# Patient Record
Sex: FEMALE | Race: WHITE | Employment: OTHER | URBAN - METROPOLITAN AREA
[De-identification: names, ages, dates, MRNs, and addresses within clinical notes are randomized per-mention and may not be internally consistent; named-entity substitution may affect disease eponyms.]

---

## 2018-09-19 ENCOUNTER — APPOINTMENT (OUTPATIENT)
Dept: GENERAL RADIOLOGY | Facility: CLINIC | Age: 73
End: 2018-09-19
Attending: EMERGENCY MEDICINE
Payer: MEDICARE

## 2018-09-19 ENCOUNTER — HOSPITAL ENCOUNTER (EMERGENCY)
Facility: CLINIC | Age: 73
Discharge: HOME | End: 2018-09-19
Attending: EMERGENCY MEDICINE | Admitting: EMERGENCY MEDICINE
Payer: MEDICARE

## 2018-09-19 VITALS
HEART RATE: 69 BPM | OXYGEN SATURATION: 93 % | RESPIRATION RATE: 18 BRPM | DIASTOLIC BLOOD PRESSURE: 67 MMHG | TEMPERATURE: 97.4 F | SYSTOLIC BLOOD PRESSURE: 157 MMHG

## 2018-09-19 DIAGNOSIS — S73.005A DISLOCATION OF LEFT HIP, INITIAL ENCOUNTER (H): ICD-10-CM

## 2018-09-19 LAB
ANION GAP SERPL CALCULATED.3IONS-SCNC: 9 MMOL/L (ref 3–14)
BASOPHILS # BLD AUTO: 0 10E9/L (ref 0–0.2)
BASOPHILS NFR BLD AUTO: 0.6 %
BUN SERPL-MCNC: 23 MG/DL (ref 7–30)
CALCIUM SERPL-MCNC: 8.6 MG/DL (ref 8.5–10.1)
CHLORIDE SERPL-SCNC: 106 MMOL/L (ref 94–109)
CO2 SERPL-SCNC: 24 MMOL/L (ref 20–32)
CREAT SERPL-MCNC: 0.81 MG/DL (ref 0.52–1.04)
DIFFERENTIAL METHOD BLD: NORMAL
EOSINOPHIL # BLD AUTO: 0.1 10E9/L (ref 0–0.7)
EOSINOPHIL NFR BLD AUTO: 2.1 %
ERYTHROCYTE [DISTWIDTH] IN BLOOD BY AUTOMATED COUNT: 12.8 % (ref 10–15)
GFR SERPL CREATININE-BSD FRML MDRD: 69 ML/MIN/1.7M2
GLUCOSE SERPL-MCNC: 126 MG/DL (ref 70–99)
HCT VFR BLD AUTO: 44.3 % (ref 35–47)
HGB BLD-MCNC: 14.6 G/DL (ref 11.7–15.7)
IMM GRANULOCYTES # BLD: 0 10E9/L (ref 0–0.4)
IMM GRANULOCYTES NFR BLD: 0.4 %
LYMPHOCYTES # BLD AUTO: 1.5 10E9/L (ref 0.8–5.3)
LYMPHOCYTES NFR BLD AUTO: 29.1 %
MCH RBC QN AUTO: 30.6 PG (ref 26.5–33)
MCHC RBC AUTO-ENTMCNC: 33 G/DL (ref 31.5–36.5)
MCV RBC AUTO: 93 FL (ref 78–100)
MONOCYTES # BLD AUTO: 0.4 10E9/L (ref 0–1.3)
MONOCYTES NFR BLD AUTO: 7.8 %
NEUTROPHILS # BLD AUTO: 3.1 10E9/L (ref 1.6–8.3)
NEUTROPHILS NFR BLD AUTO: 60 %
NRBC # BLD AUTO: 0 10*3/UL
NRBC BLD AUTO-RTO: 0 /100
PLATELET # BLD AUTO: 164 10E9/L (ref 150–450)
POTASSIUM SERPL-SCNC: 4 MMOL/L (ref 3.4–5.3)
RBC # BLD AUTO: 4.77 10E12/L (ref 3.8–5.2)
SODIUM SERPL-SCNC: 139 MMOL/L (ref 133–144)
WBC # BLD AUTO: 5.2 10E9/L (ref 4–11)

## 2018-09-19 PROCEDURE — 80048 BASIC METABOLIC PNL TOTAL CA: CPT | Performed by: EMERGENCY MEDICINE

## 2018-09-19 PROCEDURE — 99285 EMERGENCY DEPT VISIT HI MDM: CPT | Mod: 25

## 2018-09-19 PROCEDURE — 25000128 H RX IP 250 OP 636: Performed by: EMERGENCY MEDICINE

## 2018-09-19 PROCEDURE — 27265 TREAT HIP DISLOCATION: CPT | Mod: LT

## 2018-09-19 PROCEDURE — 99152 MOD SED SAME PHYS/QHP 5/>YRS: CPT

## 2018-09-19 PROCEDURE — 96374 THER/PROPH/DIAG INJ IV PUSH: CPT

## 2018-09-19 PROCEDURE — 85025 COMPLETE CBC W/AUTO DIFF WBC: CPT | Performed by: EMERGENCY MEDICINE

## 2018-09-19 PROCEDURE — 40000275 ZZH STATISTIC RCP TIME EA 10 MIN

## 2018-09-19 PROCEDURE — 96361 HYDRATE IV INFUSION ADD-ON: CPT

## 2018-09-19 PROCEDURE — 40000965 ZZH STATISTIC END TITIAL CO2 MONITORING

## 2018-09-19 PROCEDURE — 96375 TX/PRO/DX INJ NEW DRUG ADDON: CPT | Mod: 59

## 2018-09-19 PROCEDURE — 40000986 XR PELVIS AND HIP LEFT 1 VIEW

## 2018-09-19 PROCEDURE — 73502 X-RAY EXAM HIP UNI 2-3 VIEWS: CPT

## 2018-09-19 RX ORDER — PROPOFOL 10 MG/ML
300 INJECTION, EMULSION INTRAVENOUS ONCE
Status: COMPLETED | OUTPATIENT
Start: 2018-09-19 | End: 2018-09-19

## 2018-09-19 RX ORDER — MORPHINE SULFATE 4 MG/ML
4 INJECTION, SOLUTION INTRAMUSCULAR; INTRAVENOUS ONCE
Status: COMPLETED | OUTPATIENT
Start: 2018-09-19 | End: 2018-09-19

## 2018-09-19 RX ORDER — ONDANSETRON 2 MG/ML
4 INJECTION INTRAMUSCULAR; INTRAVENOUS ONCE
Status: COMPLETED | OUTPATIENT
Start: 2018-09-19 | End: 2018-09-19

## 2018-09-19 RX ADMIN — SODIUM CHLORIDE 500 ML: 9 INJECTION, SOLUTION INTRAVENOUS at 15:46

## 2018-09-19 RX ADMIN — PROPOFOL 80 MG: 10 INJECTION, EMULSION INTRAVENOUS at 16:42

## 2018-09-19 RX ADMIN — ONDANSETRON 4 MG: 2 INJECTION INTRAMUSCULAR; INTRAVENOUS at 15:47

## 2018-09-19 RX ADMIN — MORPHINE SULFATE 4 MG: 4 INJECTION INTRAVENOUS at 15:46

## 2018-09-19 ASSESSMENT — ENCOUNTER SYMPTOMS
MYALGIAS: 1
ARTHRALGIAS: 1

## 2018-09-19 NOTE — ED TRIAGE NOTES
"Pt presents to ED via EMS from home with complaints of left hip pain. Pt bent over at home with box full of food and felt a \"pop\" in her left hip and had immediate pain. Hx 3 left hip dislocations in past 1.5 years. Left leg is shortened and dislocated. EMS gave 100mcg fentanyl with improvement of pain PTA. CMS intact.  ABCs intact. A&Ox3. VSS.     "

## 2018-09-19 NOTE — ED PROVIDER NOTES
History     Chief Complaint:  Left hip pain    HPI   Malou Mccollum is a 72 year old female with a history osteoarthritis and bilateral hip replacement who presents with left hip pain and dislocation. The patient presents to the ED after bending over and feeling a pop in her left hip with immediate pain. Per EMS, her left leg appears shortened and she was given 100 mcg of fentanyl with improvement of pain en route. The patient says that this may be her 3rd left hip dislocation in the past year and a half after fracturing her hip some time ago. She has not had surgery on the left hip and has only been treated with reduction previously. The patient's last intake was two pieces of toast at 0700.  Her primary surgeon is at [facility].    Allergies:  Penicillin     Medications:    calcium citrate-vitamin D (CALCIUM CITRATE + D) 315-250 MG-UNIT tablet   Take 1 Tab by mouth two times a day.   0 06/14/2017   Active   senna (SENOKOT) 8.6 MG tablet    Indications: Constipation Take 1 Tab by mouth daily. Take while on narcotics. Hold for loose stools. Indications: Constipation 30 Tab   0 04/24/2018   Active   aspirin EC 81 MG enteric coated tablet    Indications: Thrombosis prevention following orthopedic surgery. Take 2 Tabs by mouth daily for 42 days. Over the counter medication. If on previous aspirin, resume previous aspirin dosing after 42 days. Indications: Thrombosis prevention following orthopedic surgery. 84 Tab   0 04/24/2018   Active   atorvastatin (LIPITOR) 40 MG tablet    Indications: Hyperlipidemia, unspecified hyperlipidemia type, Type 2 diabetes mellitus with peripheral neuropathy (HRC) Take 1 Tablet by mouth daily. 90 Tablet   3 08/06/2018 08/06/2019 Active   Cyanocobalamin 1000 MCG/ML KIT    Indications: Vitamin B12 deficiency Inject 1 mL subcutaneously every 3 weeks. Indications: VITAMIN B12 DEFICIENCY 20 Kit   0 08/06/2018   Active   cholecalciferol (VITAMIN D3) 1000 units tablet    Indications:  Osteoporosis, unspecified osteoporosis type, unspecified pathological fracture presence, Vitamin D deficiency Take 3 Tablets by mouth daily. 270 Tablet   3 08/06/2018   Active   ferrous gluconate (FERGON) 324 (37.5 Fe) MG tablet    Indications: Iron Deficiency Anemia Take 1 Tablet by mouth daily. Indications: Anemia From Inadequate Iron in the Body 90 Tablet   3 08/06/2018 08/06/2019 Active   fexofenadine (ALLEGRA) 180 MG tablet    Indications: Seasonal allergic rhinitis, unspecified trigger Take 1 Tablet by mouth daily. 90 Tablet   3 08/06/2018   Active   omeprazole (PRILOSEC) 20 MG capsule    Indications: Gastroesophageal reflux disease without esophagitis Take 2 Capsules by mouth daily. Take 1 hour before a meal. 180 Capsule   3 08/06/2018   Active   oxyCODONE-acetaminophen (PERCOCET) 7.5-325 MG tablet       0 08/03/2018   Active   gabapentin (NEURONTIN) 300 MG capsule    Indications: Neuropathic Pain Take 4 Capsules by mouth three times a day. Indications: Neuropathic Pain 1080 Capsule   3 08/06/2018   Active   tiZANidine (ZANAFLEX) 2 MG tablet    Indications: Acute midline thoracic back pain Take 1 Tablet by mouth every 8 hours as needed. 30 Tablet   1 08/16/2018   Active   OXcarbazepine (TRILEPTAL) 300 MG tablet   TAKE 2 TABLETS IN THE A.M. AND 1 IN THE P.M. 270 Tablet   1 08/31/2018   Active   sertraline (ZOLOFT) 100 MG tablet   Take 1.5 Tablets by mouth daily. 135 Tablet   1 08/31/2018   Active       Past Medical History:    History of total right hip replacement 04/24/2018   Overview:     Dr Neo Harris @ [facility]     History of fatty infiltration of liver 03/26/2018   Pain of right hip joint 03/15/2018   Overview:     Added automatically from request for surgery [id number]     Primary osteoarthritis of right hip 02/13/2018   Closed fracture of left tibial plateau, with routine healing, subsequent encounter 02/13/2018   Type 2 diabetes mellitus with peripheral neuropathy (HRC) 01/25/2018   Primary  osteoarthritis of right knee 10/09/2017   Hip dislocation, left, sequela 10/09/2017   Hip dislocation, left ([facility]) 06/23/2017   Dislocation of internal left hip prosthesis 06/08/2017   Primary osteoarthritis of left shoulder 09/07/2016   Paralabral cyst of left shoulder 09/07/2016   Adenomatous polyp of colon 03/17/2016   Overview:     nextr colonoscopy 6/2016, piecemeal resection 3/2016       Benign cyst of left kidney 09/24/2015   Bulging lumbar disc (HR) 06/20/2014   Osteoarthrosis, hand 08/16/2013   Overview:     Osteoarthrosis, unspecified whether generalized or localized, hand     H/O gastric bypass 05/22/2013   Bradycardia 09/26/2012   ISABEL (iron deficiency anemia) 12/21/2011   Bipolar I disorder ([facility]) 12/21/2011   Osteoarthritis, multiple sites 12/21/2011   GERD (gastroesophageal reflux disease) 12/21/2011   Hyperlipidemia ([facility]) 12/21/2011   Nephrolithiasis 12/21/2011   Obesity ([facility]) 12/21/2011   Osteoporosis ([facility]) 12/21/2011   Low back pain ([facility]) 12/21/2011   Overview:     LBP (low back pain)     Neuropathy, peripheral 12/21/2011   Allergic rhinitis 12/21/2011   Vitamin D deficiency ([facility]) 12/21/2011   Tobacco abuse ([facility]) 12/21/2011       Problem Noted Date Resolved Date   Family history of fatty liver 03/26/2018 03/26/2018   Fall in (into) shower or empty bathtub, initial encounter 06/08/2017 06/14/2017   Acute blood loss anemia 06/08/2017 04/24/2018   Closed fracture of left tibial plateau 06/08/2017 04/24/2018   Acute renal failure (HRC) 03/05/2014 04/11/2014   Hypokalemia 03/05/2014 04/11/2014   Diarrhea 03/05/2014 06/14/2017   Dehydration 03/05/2014 04/11/2014   Hyponatremia 03/05/2014 04/11/2014   Osteoarthrosis, hand 05/15/2012 06/21/2012   Overview:     Osteoarthrosis, unspecified whether generalized or localized, hand     Type 2 diabetes mellitus (HRC) 12/21/2011 01/25/2018   Essential hypertension (HRC) 12/21/2011 06/05/2015   Chronic kidney disease (CKD), stage III (moderate) ([facility]) 12/21/2011  2014   Osteoarthrosis, hand 2010   Overview:     DJD Hand     Other specified sites of sprains and strains 2010   Overview:     Overuse Syndrome     Vitamin D deficiency ([facility]) 2009   Osteoporosis ([facility]) 2008   Osteoarthritis of multiple joints 2008   Overview:     DJD Multiple Sites     Iron deficiency anemia 2006   Overview:     LW Onset: 26Reu52  ; Anemia Iron Deficiency     History of  section 10/11/2005 2011   Overview:     LW Onset: 11Omw99  ; C Section Previous Comp Preg     Bipolar I disorder ([facility]) 2005   Overview:     Bipolar I disorder, most recent episode (or current) unspecified ([facility])     Essential hypertension ([facility]) 2003   Overview:     Hypertension     Tobacco use disorder ([facility]) 2003   Overview:     Tobacco Abuse     Hyperlipidemia ([facility]) 2003   Esophageal reflux 2003   Overview:     Gastroesophageal Reflux Disease     Lumbago ([facility]) 2003   Overview:     Pain Low Back     Osteoarthritis 2003   Overview:     DJD     Type 2 diabetes mellitus, controlled ([facility]) 2003   Overview:     DM Type2     Allergic rhinitis 2003   Overview:     Rhinitis Allergic NOS     Hereditary and idiopathic peripheral neuropathy 2003   Overview:     Peripheral Neuropathy NOS     Obesity ([facility]) 2003   Calculus of kidney 2003   Overview:     Nephrolithiasis       Past Surgical History:    GASTRIC BYPASS     2000   TONSILLECTOMY          SECTION         OVARIAN CYST SURGERY         TUBAL LIGATION         HX APPENDECTOMY         BLEPHAROPLASTY         NASAL SEPTUM SURGERY         DENTAL SURGERY     4 implants   MANDIBLE SURGERY     chin implant removed   WISDOM TEETH EXTRACTION         LITHOTRIPSY        "  BLPHPLSTY UP EYELD; W/EXCESS SKIN 1/19/98 Bilateral bleph,ptosis/SMR   BLPHPLSTY UP EYELD; W/EXCESS SKIN 09/15/2016 Bilateral bleph,ptosis smr   SI joint block 05/11/2017       KNEE SURGERY         JOINT REPLACEMENT 04/24/2018 Right total hip arthroplasty, Dr. Denise   TOTAL HIP ARTHROPLASTY 04/24/2018 Right Dr Neo Harris @ [facility]     Family History:    History reviewed. No pertinent family history.     Social History:  Marital Status:        Review of Systems   Musculoskeletal: Positive for arthralgias (Hip pain) and myalgias.   All other systems reviewed and are negative.  Pt presents to ED via EMS from home with complaints of left hip pain. Pt bent over at home with box full of food and felt a \"pop\" in her left hip and had immediate pain. Hx 3 left hip dislocations in past 1.5 years. Left leg is shortened and dislocated. EMS gave 100mcg fentanyl with improvement of pain PTA.    Physical Exam     Patient Vitals for the past 24 hrs:   BP Temp Temp src Pulse Heart Rate Resp SpO2   09/19/18 1815 - - - - - - 93 %   09/19/18 1813 - - - - - - 94 %   09/19/18 1800 157/67 - - - - - -   09/19/18 1730 123/56 - - - 65 - 94 %   09/19/18 1715 - - - - 61 - 94 %   09/19/18 1700 116/55 - - - 59 - 98 %   09/19/18 1653 - - - - 59 - 93 %   09/19/18 1652 - - - - 60 - 94 %   09/19/18 1651 119/57 - - - 55 - 96 %   09/19/18 1650 - - - - 58 - 96 %   09/19/18 1649 - - - - 56 - 98 %   09/19/18 1648 127/55 - - - 57 - 100 %   09/19/18 1647 - - - - 58 - 99 %   09/19/18 1646 - - - - 58 - 98 %   09/19/18 1645 122/43 - - - 62 - 98 %   09/19/18 1644 - - - - 63 - (!) 74 %   09/19/18 1643 - - - - 60 - (!) 83 %   09/19/18 1642 139/77 - - - 59 - 97 %   09/19/18 1641 - - - - 60 - 98 %   09/19/18 1640 132/58 - - - 61 - 95 %   09/19/18 1626 - - - - 62 - 94 %   09/19/18 1625 - - - - 63 - (!) 88 %   09/19/18 1624 - - - - 61 - (!) 88 %   09/19/18 1623 - - - - 60 - 94 %   09/19/18 1622 - - - - 61 - 95 %   09/19/18 1525 (!) 128/96 97.4  F " (36.3  C) Oral 69 69 18 96 %     Physical Exam   Musculoskeletal:        Legs:    GEN: patient appears tired  HEAD: atraumatic, normocephalic  EYES: pupils reactive, conjunctivae normal  ENT: TMs flat and white bilaterally, oropharynx normal with no erythema or exudate, mucus membranes dry  NECK: no posterior midline tenderness  RESPIRATORY: no tachypnea, breath sounds clear to auscultation (no rales, wheezes, rhonchi), chest wall nontender, normal phonation  CVS: normal S1/S2, II/VI systolic ej murmurs, no rubs/gallops  ABDOMEN: soft, nontender, no masses or organomegaly, no rebound, decreased bowel sounds  BACK: no spinal tenderness  EXTREMITIES: intact pulses x 2 (radial pulses intact), no edema  MUSCULOSKELETAL: left leg internally rotated, later full ROM after reduction  SKIN: warm and dry, cap refill < 3 seconds  NEURO: GCS 15, cranial nerves intact.  Motor- moves all 4 extremities.  lter DF abd Pf 5/5.  Distal sensation on the feet are intact.  Sensation- intact Reflexes- DTRs 2plus.  Coordination-not able to ambulate on the left until after reduction.  Then full ROM.  Overall symmetrical exam  HEME: no bruising or petechiae/contusions  LYMPH: no lymphadenopathy    Emergency Department Course     Imaging:  Radiographic findings were communicated with the patient who voiced understanding of the findings.    XR Pelvis and Hip Left, 2 views:   Bilateral total hip arthroplasties. The LEFT is dislocated  with the femoral head component being posterior and superior to the  acetabular component. No fractures are seen.  Result per radiology.    XR Pelvis and Hip Left, 2 views:   Interval relocation of left femoral prosthesis into the  left acetabular cup. No evidence for fracture.  Result per radiology.    Laboratory:  CBC: WNL (WBC 5.2, HGB 14.6, )  BMP: WNL (Creatinine 0.71)    Procedures:  [facility] Procedure Note        Sedation:      Performed by: Dr. Díaz  Authorized by:   Bre    Pre-Procedure Assessment done at 3:30p    Expected Level:  Moderate Sedation    Indication:  Sedation is required to allow for joint reduction    Consent obtained from patient after discussing the risks, benefits and alternatives.    PO Intake:  Appropriately NPO for procedure    ASA Class:  II    Mallampati:  II    Lungs:Lungs Clear with good breath sounds bilaterally     Heart: Normal heart sounds and rate    History and physical reviewed and no updates needed. I have reviewed the lab findings, diagnostic data, medications, and the plan for sedation. I have determined this patient to be an appropriate candidate for the planned sedation and procedure and have reassessed the patient IMMEDIATELY PRIOR to sedation and procedure.    Sedation Post Procedure Summary:    Prior to the start of the procedure and with procedural staff participation, I verbally confirmed the patient s identity using two indicators, relevant allergies, that the procedure was appropriate and matched the consent or emergent situation, and that the correct equipment/implants were available. Immediately prior to starting the procedure I conducted the Time Out with the procedural staff and re-confirmed the patient s name, procedure, and site/side. (The Joint Commission universal protocol was followed.)  Yes      Sedatives: IV Propofol 80 mg     Vital signs, airway ETC)2 and pulse oximetry were monitored and remained stable throughout the procedure and sedation was maintained until the procedure was complete.  The patient was monitored by staff until sedation discharge criteria were met.    Patient tolerance: Oxygen Desaturation down to 74%, resolved with:   Supplementing/Increasing oxygen, Airway Repositioning (e.g. jaw thrust, chin lift) and Positive Pressure ventillation applied (e.g. bag mask).  Right nasopharygeal airway inserted while patient getting a minute of positive pressure bagging.  ETCo2 remained > 15 the entire  time.    Time of sedation in minutes: 25 minutes from beginning to end of physician one to one monitoring.     Narrative: Procedure: Reduction       Location: Left Hip     Consent:  Risks, benefits and alternatives were discussed with patient and consent for procedure was obtained.     Timeout:  Universal protocol was followed. TIME OUT conducted just prior to starting procedure confirmed patient identity, site/side, procedure, patient position, and availability of correct equipment and implants? Yes      Medication: 80 mg Propofol: IV     Procedure Note:  Left hip externally rotated and counter pressure placed at the left greater trochanter.  Pop was felt and palpated with reduction of the left hip.  CMS intact.  Femoral pulse intact.     Patient Status:  Patient tolerated the procedure well.  There were no complications.   Interventions:  1546 - NS 0.5L IV Bolus  1546 - Morphine 4 mg IV injection  1547 - Zofran 4 mg IV injection  1642 - Propofol  80 mg IV injection x 1    The patient's symptoms were improved with parenteral narcotics.    Emergency Department Course:  The patient arrived in the emergency department via EMS.    Past medical records, nursing notes, and vitals reviewed.  1520: I performed an exam of the patient and obtained history, as documented above.    The patient was placed on cardiac monitoring. IV inserted and blood drawn.  The patient was sent for a hip x-ray while in the emergency department, findings above.    1619: Patient updated.    1642: Patient sedated with 80 mg of IV Propofol as noted above.    1644: Patient's left hip successfully reduced as noted above.     I called the patient's son to update him.     Patient sent for repeat x-ray.     1775: Updated patient  /67  Pulse 69  Temp 97.4  F (36.3  C) (Oral)  Resp 18  SpO2 93%    6:30 PM Patient's family here for her, up and ambulatory    Discussed results with patient.  Gave patient copies of all results (applicable labs, CT  scans and/or ultrasounds).  Answered questions.  Asked patient to followup with PCP.  Circled any abnormal lab values and asked patient to followup with PCP.    CMS intact prior to discharge.     Impression & Plan      Medical Decision Making:  Malou Mccollum is a 72 year old female who is status post left hip dislocation. An IV was in place via EMS and the patient had been given IV pain medicine. Here, the patient had noted internal rotation of the left hip and she was taken for a pre-reduction x-ray that confirms that the left femoral prosthesis was out of place, but not fractured. The patient was uncomfortable and given Morphine in addition to Zofran and a sodium chloride bolus.     She was consented for procedural sedation and was given less than 1 mg/kg of the Propofol which was 80 mg (~85 kg) and I was able to reduce the left hip easily with several manipulations. The patient did have an episode where she became apneic, but we were able to bagging and positive pressure ventilations to get her saturations to climb back into the 80s. The patient then woke and there were no vomiting or complications. The left hip x-ray shows that the hip is in place and there is no evidence of fracture post-reduction. The patient is comfortable following up as an outpatient with Dr. Harris who is her Orthopedist at [facility].     Disposition home.     Diagnosis:    ICD-10-CM    1. Dislocation of left hip, initial encounter (H) S73.005A      Disposition:  Discharged to home.    Instructions to patient:  Followup Dr Harris.    Avoid stress and abducting with the left hip.    You may be sore a few days!    Hydrate and push fluids.    Ice to the area.    Aung Banegas  9/19/2018   [facility] EMERGENCY DEPARTMENT    Scribe Disclosure:  MORIS, Aung Banegas, am serving as a scribe at 3:22 PM on 9/19/2018 to document services personally performed by Teresa Díaz MD based on my observations and the provider's statements to me.          Teresa Díaz MD  09/22/18 1147

## 2018-09-19 NOTE — ED NOTES
Ambulated Pt down the bee and back. Pt was able to walk confidently on her own, denying any pain or other adverse Sx.

## 2018-09-19 NOTE — PROGRESS NOTES
RT- Attended conscious sedation. Ambu bag and suction set up, Ambu bag used for approximately three minutes with a nasal airway placed. Patient was placed on ETCO2 with 2L oxygen. Stayed until patient was awake and alert.     Denise Rob  September 19, 2018 5:04 PM

## 2018-09-19 NOTE — DISCHARGE INSTRUCTIONS
Followup Dr Harris.    Avoid stress and abducting with the left hip.    You may be sore a few days!    Hydrate and push fluids.    Ice to the area.

## 2018-09-19 NOTE — ED AVS SNAPSHOT
[facility] Emergency Department    [address]    [facility] [address]    Phone:  [phone]    Fax:  [phone]                                       Malou Mccollum   MRN: [id number]    Department:  [facility] Emergency Department   Date of Visit:  9/19/2018           After Visit Summary Signature Page     I have received my discharge instructions, and my questions have been answered. I have discussed any challenges I see with this plan with the nurse or doctor.    ..........................................................................................................................................  Patient/Patient Representative Signature      ..........................................................................................................................................  Patient Representative Print Name and Relationship to Patient    ..................................................               ................................................  Date                                   Time    ..........................................................................................................................................  Reviewed by Signature/Title    ...................................................              ..............................................  Date                                               Time          22EPIC Rev 08/18

## 2018-09-19 NOTE — ED AVS SNAPSHOT
[facility] Emergency Department    [address] [place] [address]    Phone:  [phone]    Fax:  [phone]                                       Malou Mccollum   MRN: [id number]    Department:  [facility] Emergency Department   Date of Visit:  9/19/2018           Patient Information     Date Of Birth          1945        Your diagnoses for this visit were:     Dislocation of left hip, initial encounter (H)        You were seen by Britt Meadows MD and Teresa Díaz MD.      Follow-up Information     Follow up with Addis Adams.    Why:  [facility]    Contact information:    [facility]  [address] [place] [id number] [phone]          Discharge Instructions       Followup Dr Harris.    Avoid stress and abducting with the left hip.    You may be sore a few days!    Hydrate and push fluids.    Ice to the area.    Discharge References/Attachments     DISLOCATION, JOINT (ENGLISH)    HIP DISLOCATION, TRAUMATIC, (REDUCED) (ENGLISH)    DISLOCATION AFTER HIP REPLACEMENT, REDUCED (ENGLISH)    SEDATION, PROCEDURAL (ENGLISH)    SEDATION, PROCEDURAL (ADULT) (ENGLISH)      24 Hour Appointment Hotline       To make an appointment at any [facility], call [phone] ([phone]). If you don't have a family doctor or clinic, we will help you find one. [facility] clinics are conveniently located to serve the needs of you and your family.             Review of your medicines      Notice     You have not been prescribed any medications.            Procedures and tests performed during your visit     Procedure/Test Number of Times Performed    Basic metabolic panel 1    CBC with platelets differential 1    Peripheral IV catheter 1    XR Pelvis w Hip Left 1 View 2      Orders Needing Specimen Collection     None      Pending Results     No orders found from 9/17/2018 to 9/20/2018.            Pending Culture  Results     No orders found from 9/17/2018 to 9/20/2018.            Pending Results Instructions     If you had any lab results that were not finalized at the time of your Discharge, you can call the ED Lab Result RN at [phone]. You will be contacted by this team for any positive Lab results or changes in treatment. The nurses are available 7 days a week from 10A to 6:30P.  You can leave a message 24 hours per day and they will return your call.        Test Results From Your Hospital Stay        9/19/2018  4:00 PM      Component Results     Component Value Ref Range & Units Status    WBC 5.2 4.0 - 11.0 10e9/L Final    RBC Count 4.77 3.8 - 5.2 10e12/L Final    Hemoglobin 14.6 11.7 - 15.7 g/dL Final    Hematocrit 44.3 35.0 - 47.0 % Final    MCV 93 78 - 100 fl Final    MCH 30.6 26.5 - 33.0 pg Final    MCHC 33.0 31.5 - 36.5 g/dL Final    RDW 12.8 10.0 - 15.0 % Final    Platelet Count 164 150 - 450 10e9/L Final    Diff Method Automated Method  Final    % Neutrophils 60.0 % Final    % Lymphocytes 29.1 % Final    % Monocytes 7.8 % Final    % Eosinophils 2.1 % Final    % Basophils 0.6 % Final    % Immature Granulocytes 0.4 % Final    Nucleated RBCs 0 0 /100 Final    Absolute Neutrophil 3.1 1.6 - 8.3 10e9/L Final    Absolute Lymphocytes 1.5 0.8 - 5.3 10e9/L Final    Absolute Monocytes 0.4 0.0 - 1.3 10e9/L Final    Absolute Eosinophils 0.1 0.0 - 0.7 10e9/L Final    Absolute Basophils 0.0 0.0 - 0.2 10e9/L Final    Abs Immature Granulocytes 0.0 0 - 0.4 10e9/L Final    Absolute Nucleated RBC 0.0  Final         9/19/2018  4:14 PM      Component Results     Component Value Ref Range & Units Status    Sodium 139 133 - 144 mmol/L Final    Potassium 4.0 3.4 - 5.3 mmol/L Final    Chloride 106 94 - 109 mmol/L Final    Carbon Dioxide 24 20 - 32 mmol/L Final    Anion Gap 9 3 - 14 mmol/L Final    Glucose 126 (H) 70 - 99 mg/dL Final    Urea Nitrogen 23 7 - 30 mg/dL Final    Creatinine 0.81 0.52 - 1.04 mg/dL Final    GFR Estimate 69  >60 mL/min/1.7m2 Final    Non  GFR Calc    GFR Estimate If Black 84 >60 mL/min/1.7m2 Final    African American GFR Calc    Calcium 8.6 8.5 - 10.1 mg/dL Final               9/19/2018  4:16 PM      Narrative     XR PELVIS AND HIP LEFT 1 VIEW 9/19/2018 4:07 PM    COMPARISON: None.    HISTORY: LEFT hip pain.        Impression     IMPRESSION: Bilateral total hip arthroplasties. The LEFT is dislocated  with the femoral head component being posterior and superior to the  acetabular component. No fractures are seen.    ISATU JOHNSON MD         9/19/2018  6:12 PM      Narrative     PELVIS WITH LEFT HIP LATERAL TWO VIEWS  9/19/2018 5:54 PM     HISTORY: Left side post reduction.     COMPARISON: 9/19/2018 at 1557.        Impression     IMPRESSION: Interval relocation of left femoral prosthesis into the  left acetabular cup. No evidence for fracture.    OPAL JIMENES MD                Clinical Quality Measure: Blood Pressure Screening     Your blood pressure was checked while you were in the emergency department today. The last reading we obtained was  BP: 157/67 . Please read the guidelines below about what these numbers mean and what you should do about them.  If your systolic blood pressure (the top number) is less than 120 and your diastolic blood pressure (the bottom number) is less than 80, then your blood pressure is normal. There is nothing more that you need to do about it.  If your systolic blood pressure (the top number) is 120-139 or your diastolic blood pressure (the bottom number) is 80-89, your blood pressure may be higher than it should be. You should have your blood pressure rechecked within a year by a primary care provider.  If your systolic blood pressure (the top number) is 140 or greater or your diastolic blood pressure (the bottom number) is 90 or greater, you may have high blood pressure. High blood pressure is treatable, but if left untreated over time it can put you at risk for heart  "attack, stroke, or kidney failure. You should have your blood pressure rechecked by a primary care provider within the next 4 weeks.  If your provider in the emergency department today gave you specific instructions to follow-up with your doctor or provider even sooner than that, you should follow that instruction and not wait for up to 4 weeks for your follow-up visit.        Thank you for choosing [facility]       Thank you for choosing [facility] for your care. Our goal is always to provide you with excellent care. Hearing back from our patients is one way we can continue to improve our services. Please take a few minutes to complete the written survey that you may receive in the mail after you visit with us. Thank you!        [employer] Information     [employer] lets you send messages to your doctor, view your test results, renew your prescriptions, schedule appointments and more. To sign up, go to [email or web address] . Click on \"Log in\" on the left side of the screen, which will take you to the Welcome page. Then click on \"Sign up Now\" on the right side of the page.     You will be asked to enter the access code listed below, as well as some personal information. Please follow the directions to create your username and password.     Your access code is: [email or web address]  Expires: 2018  6:38 PM     Your access code will  in 90 days. If you need help or a new code, please call your [facility] clinic or [phone].        Care EveryWhere ID     This is your Care EveryWhere ID. This could be used by other organizations to access your [facility] medical records  [id number]        Equal Access to Services     JONH DURÁN : Hadroger Elizondo, waaxda lugaye, qaybta kaaljustice sanz. So [facility] [phone].    ATENCIÓN: Si habla español, tiene a crystal disposición servicios gratuitos de asistencia lingüística. Llame al [phone].    We comply with applicable " federal civil rights laws and [place] laws. We do not discriminate on the basis of race, color, national origin, age, disability, sex, sexual orientation, or gender identity.            After Visit Summary       This is your record. Keep this with you and show to your community pharmacist(s) and doctor(s) at your next visit.

## 2018-09-19 NOTE — ED NOTES
Episode of apnea during sedation procedure. Pt bagged by hand for about 3 minutes and nasal airway placed. Pt now talking and breathing independently. Successful reduction of left hip.

## 2018-10-25 ENCOUNTER — APPOINTMENT (OUTPATIENT)
Dept: GENERAL RADIOLOGY | Facility: CLINIC | Age: 73
DRG: 563 | End: 2018-10-25
Attending: EMERGENCY MEDICINE
Payer: MEDICARE

## 2018-10-25 ENCOUNTER — HOSPITAL ENCOUNTER (INPATIENT)
Facility: CLINIC | Age: 73
LOS: 3 days | Discharge: NURSING, REHAB OR LONG-TERM CARE | DRG: 563 | End: 2018-10-28
Attending: EMERGENCY MEDICINE | Admitting: INTERNAL MEDICINE
Payer: MEDICARE

## 2018-10-25 ENCOUNTER — APPOINTMENT (OUTPATIENT)
Dept: CT IMAGING | Facility: CLINIC | Age: 73
DRG: 563 | End: 2018-10-25
Attending: PHYSICIAN ASSISTANT
Payer: MEDICARE

## 2018-10-25 DIAGNOSIS — S32.592A CLOSED FRACTURE OF RAMUS OF LEFT PUBIS, INITIAL ENCOUNTER (H): ICD-10-CM

## 2018-10-25 DIAGNOSIS — G89.29 CHRONIC BACK PAIN, UNSPECIFIED BACK LOCATION, UNSPECIFIED BACK PAIN LATERALITY: Primary | ICD-10-CM

## 2018-10-25 DIAGNOSIS — S42.202A CLOSED FRACTURE OF PROXIMAL END OF LEFT HUMERUS, UNSPECIFIED FRACTURE MORPHOLOGY, INITIAL ENCOUNTER: ICD-10-CM

## 2018-10-25 DIAGNOSIS — M54.9 CHRONIC BACK PAIN, UNSPECIFIED BACK LOCATION, UNSPECIFIED BACK PAIN LATERALITY: Primary | ICD-10-CM

## 2018-10-25 LAB
ANION GAP SERPL CALCULATED.3IONS-SCNC: 7 MMOL/L (ref 3–14)
BASOPHILS # BLD AUTO: 0 10E9/L (ref 0–0.2)
BASOPHILS NFR BLD AUTO: 0.5 %
BUN SERPL-MCNC: 23 MG/DL (ref 7–30)
CALCIUM SERPL-MCNC: 8.6 MG/DL (ref 8.5–10.1)
CHLORIDE SERPL-SCNC: 107 MMOL/L (ref 94–109)
CO2 SERPL-SCNC: 27 MMOL/L (ref 20–32)
CREAT SERPL-MCNC: 0.77 MG/DL (ref 0.52–1.04)
DIFFERENTIAL METHOD BLD: NORMAL
EOSINOPHIL # BLD AUTO: 0.1 10E9/L (ref 0–0.7)
EOSINOPHIL NFR BLD AUTO: 1.4 %
ERYTHROCYTE [DISTWIDTH] IN BLOOD BY AUTOMATED COUNT: 13.3 % (ref 10–15)
GFR SERPL CREATININE-BSD FRML MDRD: 73 ML/MIN/1.7M2
GLUCOSE SERPL-MCNC: 139 MG/DL (ref 70–99)
HCT VFR BLD AUTO: 42.1 % (ref 35–47)
HGB BLD-MCNC: 13.9 G/DL (ref 11.7–15.7)
IMM GRANULOCYTES # BLD: 0.1 10E9/L (ref 0–0.4)
IMM GRANULOCYTES NFR BLD: 0.8 %
INR PPP: 0.93 (ref 0.86–1.14)
LYMPHOCYTES # BLD AUTO: 1.2 10E9/L (ref 0.8–5.3)
LYMPHOCYTES NFR BLD AUTO: 17.7 %
MCH RBC QN AUTO: 31.2 PG (ref 26.5–33)
MCHC RBC AUTO-ENTMCNC: 33 G/DL (ref 31.5–36.5)
MCV RBC AUTO: 94 FL (ref 78–100)
MONOCYTES # BLD AUTO: 0.5 10E9/L (ref 0–1.3)
MONOCYTES NFR BLD AUTO: 7.3 %
NEUTROPHILS # BLD AUTO: 4.7 10E9/L (ref 1.6–8.3)
NEUTROPHILS NFR BLD AUTO: 72.3 %
NRBC # BLD AUTO: 0 10*3/UL
NRBC BLD AUTO-RTO: 0 /100
PLATELET # BLD AUTO: 178 10E9/L (ref 150–450)
POTASSIUM SERPL-SCNC: 3.8 MMOL/L (ref 3.4–5.3)
RBC # BLD AUTO: 4.46 10E12/L (ref 3.8–5.2)
SODIUM SERPL-SCNC: 141 MMOL/L (ref 133–144)
WBC # BLD AUTO: 6.6 10E9/L (ref 4–11)

## 2018-10-25 PROCEDURE — 99222 1ST HOSP IP/OBS MODERATE 55: CPT | Mod: AI | Performed by: INTERNAL MEDICINE

## 2018-10-25 PROCEDURE — 99207 ZZC APP CREDIT; MD BILLING SHARED VISIT: CPT | Performed by: PHYSICIAN ASSISTANT

## 2018-10-25 PROCEDURE — 25000132 ZZH RX MED GY IP 250 OP 250 PS 637: Mod: GY | Performed by: PHYSICIAN ASSISTANT

## 2018-10-25 PROCEDURE — 96376 TX/PRO/DX INJ SAME DRUG ADON: CPT

## 2018-10-25 PROCEDURE — 85025 COMPLETE CBC W/AUTO DIFF WBC: CPT | Performed by: EMERGENCY MEDICINE

## 2018-10-25 PROCEDURE — 73060 X-RAY EXAM OF HUMERUS: CPT | Mod: LT

## 2018-10-25 PROCEDURE — 73030 X-RAY EXAM OF SHOULDER: CPT | Mod: LT

## 2018-10-25 PROCEDURE — 25000128 H RX IP 250 OP 636: Performed by: EMERGENCY MEDICINE

## 2018-10-25 PROCEDURE — 96375 TX/PRO/DX INJ NEW DRUG ADDON: CPT

## 2018-10-25 PROCEDURE — 73502 X-RAY EXAM HIP UNI 2-3 VIEWS: CPT

## 2018-10-25 PROCEDURE — 25000128 H RX IP 250 OP 636

## 2018-10-25 PROCEDURE — 29105 APPLICATION LONG ARM SPLINT: CPT | Mod: LT

## 2018-10-25 PROCEDURE — A9270 NON-COVERED ITEM OR SERVICE: HCPCS | Mod: GY | Performed by: PHYSICIAN ASSISTANT

## 2018-10-25 PROCEDURE — 99285 EMERGENCY DEPT VISIT HI MDM: CPT | Mod: 25

## 2018-10-25 PROCEDURE — 25000132 ZZH RX MED GY IP 250 OP 250 PS 637: Mod: GY | Performed by: INTERNAL MEDICINE

## 2018-10-25 PROCEDURE — 25000128 H RX IP 250 OP 636: Performed by: PHYSICIAN ASSISTANT

## 2018-10-25 PROCEDURE — 73200 CT UPPER EXTREMITY W/O DYE: CPT | Mod: LT

## 2018-10-25 PROCEDURE — 12000000 ZZH R&B MED SURG/OB

## 2018-10-25 PROCEDURE — 96374 THER/PROPH/DIAG INJ IV PUSH: CPT

## 2018-10-25 PROCEDURE — 80048 BASIC METABOLIC PNL TOTAL CA: CPT | Performed by: EMERGENCY MEDICINE

## 2018-10-25 PROCEDURE — A9270 NON-COVERED ITEM OR SERVICE: HCPCS | Mod: GY | Performed by: INTERNAL MEDICINE

## 2018-10-25 PROCEDURE — 85610 PROTHROMBIN TIME: CPT | Performed by: EMERGENCY MEDICINE

## 2018-10-25 RX ORDER — OXCARBAZEPINE 300 MG/1
300 TABLET, FILM COATED ORAL EVERY EVENING
Status: DISCONTINUED | OUTPATIENT
Start: 2018-10-25 | End: 2018-10-28 | Stop reason: HOSPADM

## 2018-10-25 RX ORDER — NALOXONE HYDROCHLORIDE 0.4 MG/ML
.1-.4 INJECTION, SOLUTION INTRAMUSCULAR; INTRAVENOUS; SUBCUTANEOUS
Status: DISCONTINUED | OUTPATIENT
Start: 2018-10-25 | End: 2018-10-28 | Stop reason: HOSPADM

## 2018-10-25 RX ORDER — FERROUS GLUCONATE 324(38)MG
324 TABLET ORAL
COMMUNITY

## 2018-10-25 RX ORDER — ACETAMINOPHEN 325 MG/1
650 TABLET ORAL EVERY 4 HOURS PRN
Status: DISCONTINUED | OUTPATIENT
Start: 2018-10-25 | End: 2018-10-26

## 2018-10-25 RX ORDER — OXYCODONE AND ACETAMINOPHEN 7.5; 325 MG/1; MG/1
1 TABLET ORAL 3 TIMES DAILY
Status: DISCONTINUED | OUTPATIENT
Start: 2018-10-25 | End: 2018-10-26

## 2018-10-25 RX ORDER — MAGNESIUM SULFATE HEPTAHYDRATE 40 MG/ML
4 INJECTION, SOLUTION INTRAVENOUS EVERY 4 HOURS PRN
Status: DISCONTINUED | OUTPATIENT
Start: 2018-10-25 | End: 2018-10-28 | Stop reason: HOSPADM

## 2018-10-25 RX ORDER — LIDOCAINE 40 MG/G
CREAM TOPICAL
Status: DISCONTINUED | OUTPATIENT
Start: 2018-10-25 | End: 2018-10-28 | Stop reason: HOSPADM

## 2018-10-25 RX ORDER — OXCARBAZEPINE 300 MG/1
300 TABLET, FILM COATED ORAL EVERY EVENING
COMMUNITY

## 2018-10-25 RX ORDER — TIZANIDINE 2 MG/1
2 TABLET ORAL 3 TIMES DAILY PRN
COMMUNITY

## 2018-10-25 RX ORDER — POTASSIUM CHLORIDE 1.5 G/1.58G
20-40 POWDER, FOR SOLUTION ORAL
Status: DISCONTINUED | OUTPATIENT
Start: 2018-10-25 | End: 2018-10-28 | Stop reason: HOSPADM

## 2018-10-25 RX ORDER — DIPHENHYDRAMINE HCL 25 MG
25 CAPSULE ORAL EVERY 6 HOURS PRN
Status: DISCONTINUED | OUTPATIENT
Start: 2018-10-25 | End: 2018-10-28 | Stop reason: HOSPADM

## 2018-10-25 RX ORDER — TIZANIDINE 2 MG/1
2 TABLET ORAL 3 TIMES DAILY PRN
Status: DISCONTINUED | OUTPATIENT
Start: 2018-10-25 | End: 2018-10-28 | Stop reason: HOSPADM

## 2018-10-25 RX ORDER — SERTRALINE HYDROCHLORIDE 100 MG/1
150 TABLET, FILM COATED ORAL DAILY
COMMUNITY

## 2018-10-25 RX ORDER — DIPHENHYDRAMINE HYDROCHLORIDE 50 MG/ML
25 INJECTION INTRAMUSCULAR; INTRAVENOUS ONCE
Status: COMPLETED | OUTPATIENT
Start: 2018-10-25 | End: 2018-10-25

## 2018-10-25 RX ORDER — SENNOSIDES 8.6 MG
1 TABLET ORAL DAILY
Status: DISCONTINUED | OUTPATIENT
Start: 2018-10-25 | End: 2018-10-25

## 2018-10-25 RX ORDER — OXYCODONE HYDROCHLORIDE 5 MG/1
5 TABLET ORAL EVERY 4 HOURS PRN
Status: DISCONTINUED | OUTPATIENT
Start: 2018-10-25 | End: 2018-10-26

## 2018-10-25 RX ORDER — HYDROMORPHONE HYDROCHLORIDE 1 MG/ML
0.5 INJECTION, SOLUTION INTRAMUSCULAR; INTRAVENOUS; SUBCUTANEOUS
Status: COMPLETED | OUTPATIENT
Start: 2018-10-25 | End: 2018-10-25

## 2018-10-25 RX ORDER — OXCARBAZEPINE 300 MG/1
600 TABLET, FILM COATED ORAL EVERY MORNING
Status: DISCONTINUED | OUTPATIENT
Start: 2018-10-26 | End: 2018-10-28 | Stop reason: HOSPADM

## 2018-10-25 RX ORDER — HYDROMORPHONE HYDROCHLORIDE 1 MG/ML
0.5 INJECTION, SOLUTION INTRAMUSCULAR; INTRAVENOUS; SUBCUTANEOUS
Status: DISCONTINUED | OUTPATIENT
Start: 2018-10-25 | End: 2018-10-25

## 2018-10-25 RX ORDER — DIPHENHYDRAMINE HYDROCHLORIDE 50 MG/ML
25 INJECTION INTRAMUSCULAR; INTRAVENOUS EVERY 6 HOURS PRN
Status: DISCONTINUED | OUTPATIENT
Start: 2018-10-25 | End: 2018-10-28 | Stop reason: HOSPADM

## 2018-10-25 RX ORDER — SENNOSIDES 8.6 MG
1 TABLET ORAL 2 TIMES DAILY
Status: DISCONTINUED | OUTPATIENT
Start: 2018-10-25 | End: 2018-10-28 | Stop reason: HOSPADM

## 2018-10-25 RX ORDER — FEXOFENADINE HCL 180 MG/1
180 TABLET ORAL DAILY
COMMUNITY

## 2018-10-25 RX ORDER — POLYETHYLENE GLYCOL 3350 17 G/17G
17 POWDER, FOR SOLUTION ORAL DAILY PRN
Status: DISCONTINUED | OUTPATIENT
Start: 2018-10-25 | End: 2018-10-28 | Stop reason: HOSPADM

## 2018-10-25 RX ORDER — ONDANSETRON 2 MG/ML
4 INJECTION INTRAMUSCULAR; INTRAVENOUS EVERY 6 HOURS PRN
Status: DISCONTINUED | OUTPATIENT
Start: 2018-10-25 | End: 2018-10-28 | Stop reason: HOSPADM

## 2018-10-25 RX ORDER — FEXOFENADINE HCL 180 MG/1
180 TABLET ORAL DAILY
Status: DISCONTINUED | OUTPATIENT
Start: 2018-10-25 | End: 2018-10-28 | Stop reason: HOSPADM

## 2018-10-25 RX ORDER — FERROUS GLUCONATE 324(38)MG
324 TABLET ORAL
Status: DISCONTINUED | OUTPATIENT
Start: 2018-10-26 | End: 2018-10-28 | Stop reason: HOSPADM

## 2018-10-25 RX ORDER — HYDROMORPHONE HYDROCHLORIDE 1 MG/ML
INJECTION, SOLUTION INTRAMUSCULAR; INTRAVENOUS; SUBCUTANEOUS
Status: COMPLETED
Start: 2018-10-25 | End: 2018-10-25

## 2018-10-25 RX ORDER — ONDANSETRON 4 MG/1
4 TABLET, ORALLY DISINTEGRATING ORAL EVERY 6 HOURS PRN
Status: DISCONTINUED | OUTPATIENT
Start: 2018-10-25 | End: 2018-10-28 | Stop reason: HOSPADM

## 2018-10-25 RX ORDER — HYDROMORPHONE HYDROCHLORIDE 1 MG/ML
1 INJECTION, SOLUTION INTRAMUSCULAR; INTRAVENOUS; SUBCUTANEOUS ONCE
Status: COMPLETED | OUTPATIENT
Start: 2018-10-25 | End: 2018-10-25

## 2018-10-25 RX ORDER — MULTIVIT-MIN/IRON/FOLIC ACID/K 18-600-40
3000 CAPSULE ORAL DAILY
COMMUNITY

## 2018-10-25 RX ORDER — POTASSIUM CHLORIDE 7.45 MG/ML
10 INJECTION INTRAVENOUS
Status: DISCONTINUED | OUTPATIENT
Start: 2018-10-25 | End: 2018-10-28 | Stop reason: HOSPADM

## 2018-10-25 RX ORDER — CYANOCOBALAMIN 1000 UG/ML
1 INJECTION, SOLUTION INTRAMUSCULAR; SUBCUTANEOUS
COMMUNITY

## 2018-10-25 RX ORDER — GABAPENTIN 400 MG/1
1200 CAPSULE ORAL 3 TIMES DAILY
Status: DISCONTINUED | OUTPATIENT
Start: 2018-10-25 | End: 2018-10-28 | Stop reason: HOSPADM

## 2018-10-25 RX ORDER — ATORVASTATIN CALCIUM 40 MG/1
40 TABLET, FILM COATED ORAL DAILY
COMMUNITY

## 2018-10-25 RX ORDER — POTASSIUM CHLORIDE 29.8 MG/ML
20 INJECTION INTRAVENOUS
Status: DISCONTINUED | OUTPATIENT
Start: 2018-10-25 | End: 2018-10-28 | Stop reason: HOSPADM

## 2018-10-25 RX ORDER — OXCARBAZEPINE 300 MG/1
600 TABLET, FILM COATED ORAL EVERY MORNING
COMMUNITY

## 2018-10-25 RX ORDER — AMOXICILLIN 250 MG
2 CAPSULE ORAL 2 TIMES DAILY PRN
Status: DISCONTINUED | OUTPATIENT
Start: 2018-10-25 | End: 2018-10-28 | Stop reason: HOSPADM

## 2018-10-25 RX ORDER — AMOXICILLIN 250 MG
1 CAPSULE ORAL 2 TIMES DAILY PRN
Status: DISCONTINUED | OUTPATIENT
Start: 2018-10-25 | End: 2018-10-28 | Stop reason: HOSPADM

## 2018-10-25 RX ORDER — POTASSIUM CHLORIDE 1500 MG/1
20-40 TABLET, EXTENDED RELEASE ORAL
Status: DISCONTINUED | OUTPATIENT
Start: 2018-10-25 | End: 2018-10-28 | Stop reason: HOSPADM

## 2018-10-25 RX ORDER — OXYCODONE AND ACETAMINOPHEN 7.5; 325 MG/1; MG/1
1 TABLET ORAL 3 TIMES DAILY
Status: ON HOLD | COMMUNITY
End: 2018-10-27

## 2018-10-25 RX ORDER — SODIUM CHLORIDE 9 MG/ML
INJECTION, SOLUTION INTRAVENOUS CONTINUOUS
Status: ACTIVE | OUTPATIENT
Start: 2018-10-25 | End: 2018-10-25

## 2018-10-25 RX ORDER — ONDANSETRON 2 MG/ML
4 INJECTION INTRAMUSCULAR; INTRAVENOUS EVERY 30 MIN PRN
Status: DISCONTINUED | OUTPATIENT
Start: 2018-10-25 | End: 2018-10-25

## 2018-10-25 RX ORDER — GABAPENTIN 300 MG/1
1200 CAPSULE ORAL 3 TIMES DAILY
COMMUNITY

## 2018-10-25 RX ORDER — ATORVASTATIN CALCIUM 20 MG/1
40 TABLET, FILM COATED ORAL DAILY
Status: DISCONTINUED | OUTPATIENT
Start: 2018-10-25 | End: 2018-10-28 | Stop reason: HOSPADM

## 2018-10-25 RX ORDER — DIPHENHYDRAMINE HYDROCHLORIDE 50 MG/ML
INJECTION INTRAMUSCULAR; INTRAVENOUS
Status: COMPLETED
Start: 2018-10-25 | End: 2018-10-25

## 2018-10-25 RX ORDER — HYDROMORPHONE HYDROCHLORIDE 1 MG/ML
.3-.5 INJECTION, SOLUTION INTRAMUSCULAR; INTRAVENOUS; SUBCUTANEOUS
Status: DISCONTINUED | OUTPATIENT
Start: 2018-10-25 | End: 2018-10-28 | Stop reason: HOSPADM

## 2018-10-25 RX ORDER — POTASSIUM CL/LIDO/0.9 % NACL 10MEQ/0.1L
10 INTRAVENOUS SOLUTION, PIGGYBACK (ML) INTRAVENOUS
Status: DISCONTINUED | OUTPATIENT
Start: 2018-10-25 | End: 2018-10-28 | Stop reason: HOSPADM

## 2018-10-25 RX ORDER — OXYCODONE AND ACETAMINOPHEN 7.5; 325 MG/1; MG/1
1 TABLET ORAL EVERY 6 HOURS PRN
Status: DISCONTINUED | OUTPATIENT
Start: 2018-10-25 | End: 2018-10-25

## 2018-10-25 RX ORDER — SENNOSIDES A AND B 8.6 MG/1
1 TABLET, FILM COATED ORAL DAILY
COMMUNITY

## 2018-10-25 RX ADMIN — VITAMIN D, TAB 1000IU (100/BT) 3000 UNITS: 25 TAB at 14:48

## 2018-10-25 RX ADMIN — ATORVASTATIN CALCIUM 40 MG: 20 TABLET, FILM COATED ORAL at 14:49

## 2018-10-25 RX ADMIN — Medication 0.5 MG: at 12:05

## 2018-10-25 RX ADMIN — GABAPENTIN 1200 MG: 400 CAPSULE ORAL at 14:48

## 2018-10-25 RX ADMIN — OXYCODONE AND ACETAMINOPHEN 1 TABLET: 7.5; 325 TABLET ORAL at 19:28

## 2018-10-25 RX ADMIN — DIPHENHYDRAMINE HYDROCHLORIDE 25 MG: 50 INJECTION INTRAMUSCULAR; INTRAVENOUS at 12:38

## 2018-10-25 RX ADMIN — FEXOFENADINE HYDROCHLORIDE 180 MG: 180 TABLET, FILM COATED ORAL at 14:49

## 2018-10-25 RX ADMIN — SENNOSIDES 1 TABLET: 8.6 TABLET, FILM COATED ORAL at 20:18

## 2018-10-25 RX ADMIN — OMEPRAZOLE 40 MG: 20 CAPSULE, DELAYED RELEASE ORAL at 14:48

## 2018-10-25 RX ADMIN — ONDANSETRON 4 MG: 2 INJECTION INTRAMUSCULAR; INTRAVENOUS at 08:06

## 2018-10-25 RX ADMIN — GABAPENTIN 1200 MG: 400 CAPSULE ORAL at 22:25

## 2018-10-25 RX ADMIN — OXCARBAZEPINE 300 MG: 300 TABLET ORAL at 20:18

## 2018-10-25 RX ADMIN — OXYCODONE HYDROCHLORIDE 5 MG: 5 TABLET ORAL at 23:59

## 2018-10-25 RX ADMIN — OXYCODONE AND ACETAMINOPHEN 1 TABLET: 7.5; 325 TABLET ORAL at 16:21

## 2018-10-25 RX ADMIN — Medication 0.5 MG: at 09:34

## 2018-10-25 RX ADMIN — SENNOSIDES 1 TABLET: 8.6 TABLET, FILM COATED ORAL at 14:49

## 2018-10-25 RX ADMIN — SODIUM CHLORIDE: 9 INJECTION, SOLUTION INTRAVENOUS at 13:26

## 2018-10-25 RX ADMIN — Medication 1 MG: at 08:05

## 2018-10-25 RX ADMIN — Medication 0.5 MG: at 08:48

## 2018-10-25 RX ADMIN — DIPHENHYDRAMINE HYDROCHLORIDE 25 MG: 50 INJECTION, SOLUTION INTRAMUSCULAR; INTRAVENOUS at 12:38

## 2018-10-25 RX ADMIN — Medication 0.5 MG: at 10:44

## 2018-10-25 RX ADMIN — Medication 0.5 MG: at 14:22

## 2018-10-25 RX ADMIN — SERTRALINE HYDROCHLORIDE 150 MG: 100 TABLET ORAL at 14:49

## 2018-10-25 ASSESSMENT — ACTIVITIES OF DAILY LIVING (ADL)
ADLS_ACUITY_SCORE: 15
ADLS_ACUITY_SCORE: 18

## 2018-10-25 NOTE — ED NOTES
[facility]  ED Nurse Handoff Report    Malou Mccollum is a 72 year old female   ED Chief complaint: Arm Pain and Hip Pain  . ED Diagnosis:   Final diagnoses:   Closed fracture of proximal end of left humerus, unspecified fracture morphology, initial encounter   Closed fracture of ramus of left pubis, initial encounter (H)     Allergies:   Allergies   Allergen Reactions     Aleve [Naproxen]      Penicillin G      Tape [Adhesive Tape]        Code Status: Full Code  Activity level - Baseline/Home:  Stand with Assist. Activity Level - Current:   Stand with Assist. Lift room needed: No. Bariatric: No   Needed: No   Isolation: No. Infection: Not Applicable.     Vital Signs:   Vitals:    10/25/18 0845 10/25/18 0937 10/25/18 0945 10/25/18 1000   BP:  109/66     Pulse:       Resp:       Temp:       TempSrc:       SpO2: 95% 93% 96% 93%   Weight:       Height:           Cardiac Rhythm:  ,      Pain level: 0-10 Pain Scale: 10  Patient confused: No. Patient Falls Risk: Yes.   Elimination Status: Has voided   Patient Report - Initial Complaint: shoulder pain . . Focused Assessment:  Musculoskeletal - Musculoskeletal WDL:  WDL except General Mobility: moderately impaired Side: Left  Tests Performed:   Labs Ordered and Resulted from Time of ED Arrival Up to the Time of Departure from the ED   BASIC METABOLIC PANEL - Abnormal; Notable for the following:        Result Value    Glucose 139 (*)     All other components within normal limits   CBC WITH PLATELETS DIFFERENTIAL   INR   PULSE OXIMETRY NURSING     Humerus XR,  G/E 2 views, left   Preliminary Result   IMPRESSION: There is a moderately displaced fracture of the humeral   neck.      XR Shoulder Left G/E 3 Views   Preliminary Result   IMPRESSION: There is a moderately displaced fracture of the humeral   neck.      XR Pelvis and Hip Left 2 Views   Final Result   IMPRESSION:  Bilateral hip arthroplasties without evidence for   complication. Minimally  displaced fractures of the left superior and   inferior pubic rami, new since the prior exam.      SIA SHI MD        . Abnormal Results: humorous fracture. .   Treatments provided:   Medications   HYDROmorphone (PF) (DILAUDID) injection 0.5 mg (0.5 mg Intravenous Given 10/25/18 0934)   ondansetron (ZOFRAN) injection 4 mg (4 mg Intravenous Given 10/25/18 0806)   HYDROmorphone (PF) (DILAUDID) injection 1 mg (1 mg Intravenous Given 10/25/18 0805)       Family Comments: family at bedside.   OBS brochure/video discussed/provided to patient:  No  ED Medications:   Medications   HYDROmorphone (PF) (DILAUDID) injection 0.5 mg (0.5 mg Intravenous Given 10/25/18 0934)   ondansetron (ZOFRAN) injection 4 mg (4 mg Intravenous Given 10/25/18 0806)   HYDROmorphone (PF) (DILAUDID) injection 1 mg (1 mg Intravenous Given 10/25/18 0805)     Drips infusing:  No  For the majority of the shift, the patient's behavior Green. Interventions performed were monitor  .     Severe Sepsis OR Septic Shock Diagnosis Present: No      ED Nurse Name/Phone Number: Koffi Le,   10:37 AM      RECEIVING UNIT ED HANDOFF REVIEW    Above ED Nurse Handoff Report was reviewed: Yes  Reviewed by: Cheyanne Roa on October 25, 2018 at 12:39 PM

## 2018-10-25 NOTE — PROGRESS NOTES
Consult received     Patient with left superior and inferior pubic rami fractures as well as left proximal humerus fracture    Sling, non -wb through left UE  CT of left shoulder  May WBAT with walker - bilateral LE  Pain medication as needed    Formal consult to follow    Lou TERRY

## 2018-10-25 NOTE — ED NOTES
Pt placed on O2 via NC for low O2 sats after pain medications. Pt sleepy after given benadryl for itching.

## 2018-10-25 NOTE — PHARMACY-ADMISSION MEDICATION HISTORY
Admission medication history interview status for this patient is complete. See [facility] admission navigator for allergy information, prior to admission medications and immunization status.     Medication history interview source(s):Patient  Medication history resources (including written lists, pill bottles, clinic record):[facility], [facility]  [facility]    Changes made to PTA medication list:  Added: lipitor, vit d, b12 inj, iron, allegra, gabapentin, prilosec, trileptal, percocet, senna, zoloft, tiaznidine  Deleted: ----  Changed: ---    Actions taken by pharmacist (provider contacted, etc):phoned [facility] to confirm lipitor fill history, and dosing on omeprazole     Additional medication history information:None    Medication reconciliation/reorder completed by provider prior to medication history? No      For patients on insulin therapy:N    Prior to Admission medications    Medication Sig Last Dose Taking? Auth Provider   atorvastatin (LIPITOR) 40 MG tablet Take 40 mg by mouth daily 10/24/2018 at Unknown time Yes Unknown, Entered By History   cyanocobalamin (VITAMIN B12) 1000 MCG/ML injection Inject 1 mL into the muscle every 21 days 10/22/2018 Yes Unknown, Entered By History   ferrous gluconate (FERGON) 324 (38 Fe) MG tablet Take 324 mg by mouth daily (with breakfast) 10/24/2018 at Unknown time Yes Unknown, Entered By History   fexofenadine (ALLEGRA) 180 MG tablet Take 180 mg by mouth daily 10/24/2018 at Unknown time Yes Unknown, Entered By History   gabapentin (NEURONTIN) 300 MG capsule Take 1,200 mg by mouth 3 times daily 10/24/2018 at pm Yes Unknown, Entered By History   omeprazole (PRILOSEC) 20 MG CR capsule Take 40 mg by mouth daily 10/24/2018 at Unknown time Yes Unknown, Entered By History   OXcarbazepine (TRILEPTAL) 300 MG tablet Take 600 mg by mouth every morning 10/24/2018 at Unknown time Yes Unknown, Entered By History   OXcarbazepine (TRILEPTAL) 300 MG tablet Take 300 mg by  mouth every evening 10/24/2018 at Unknown time Yes Unknown, Entered By History   oxyCODONE-acetaminophen (PERCOCET) 7.5-325 MG per tablet Take 1 tablet by mouth 3 times daily 10/24/2018 at Unknown time Yes Unknown, Entered By History   senna (SENOKOT) 8.6 MG tablet Take 1 tablet by mouth daily 10/24/2018 at Unknown time Yes Unknown, Entered By History   sertraline (ZOLOFT) 100 MG tablet Take 150 mg by mouth daily 10/24/2018 at Unknown time Yes Unknown, Entered By History   tiZANidine (ZANAFLEX) 2 MG tablet Take 2 mg by mouth 3 times daily as needed for muscle spasms Past Week at Unknown time Yes Unknown, Entered By History   Vitamin D, Cholecalciferol, 1000 units TABS Take 3,000 Units by mouth daily 10/24/2018 at Unknown time Yes Unknown, Entered By History

## 2018-10-25 NOTE — ED TRIAGE NOTES
Lost balance when walking to the bathroom after getting up at 0620. Pt landed on left side. Hx of L hip replacement 17 years ago and fx of L hip 18 months ago. L hip dislocation about a month ago. Pain in the L hip and severe pain in the L arm humoral area. 100 mcg of fentanyl given by EMS. Pt denies beng on blood thinners or hitting head. Denies LOC or syncopal.

## 2018-10-25 NOTE — IP AVS SNAPSHOT
[facility]    [address] [facility] [address]    Phone:  [phone]    Fax:  [phone]                                       After Visit Summary   10/25/2018    Malou Mccollum    MRN: [id number]           After Visit Summary Signature Page     I have received my discharge instructions, and my questions have been answered. I have discussed any challenges I see with this plan with the nurse or doctor.    ..........................................................................................................................................  Patient/Patient Representative Signature      ..........................................................................................................................................  Patient Representative Print Name and Relationship to Patient    ..................................................               ................................................  Date                                   Time    ..........................................................................................................................................  Reviewed by Signature/Title    ...................................................              ..............................................  Date                                               Time          22EPIC Rev 08/18

## 2018-10-25 NOTE — IP AVS SNAPSHOT
"[facility] ORTHO SPINE: [phone]                                              INTERAGENCY TRANSFER FORM - PHYSICIAN ORDERS   10/25/2018                    Hospital Admission Date: 10/25/2018  MARY CANTOR   : 1945  Sex: Female        Attending Provider: Neo Mora MD     Allergies:  Aleve [Naproxen], Penicillin G, Tape [Adhesive Tape]    Infection:  None   Service:  GENERAL MEDI    Ht:  1.575 m (5' 2\")   Wt:  89.4 kg (197 lb)   Admission Wt:  84.4 kg (186 lb)    BMI:  36.03 kg/m 2   BSA:  1.98 m 2            Patient PCP Information     Provider PCP Type    [facility]      ED Clinical Impression     Diagnosis Description Comment Added By Time Added    Closed fracture of proximal end of left humerus, unspecified fracture morphology, initial encounter [S42.202A] Closed fracture of proximal end of left humerus, unspecified fracture morphology, initial encounter [S42.202A]  Miles Keane MD 10/25/2018  9:43 AM    Closed fracture of ramus of left pubis, initial encounter (H) [S32.592A] Closed fracture of ramus of left pubis, initial encounter (H) [S32.592A]  Miles Keane MD 10/25/2018  9:44 AM      Hospital Problems as of 10/28/2018              Priority Class Noted POA    Fracture of pubic ramus, left, closed, initial encounter (H) Medium  10/25/2018 Yes      Non-Hospital Problems as of 10/28/2018     None      Code Status History     Date Active Date Inactive Code Status Order ID Comments User Context    10/27/2018 10:15 AM  Full Code [id number]  Neo Mora MD Outpatient    10/25/2018  1:09 PM 10/27/2018 10:15 AM Full Code [id number]  Lou Silva PA-C Inpatient         Medication Review      START taking        Dose / Directions Comments    acetaminophen 500 MG tablet   Commonly known as:  TYLENOL   Used for:  Closed fracture of proximal end of left humerus, unspecified fracture morphology, initial encounter, Closed fracture of ramus of left pubis, initial " "encounter (H)        Dose:  500 mg   Take 1 tablet (500 mg) by mouth every 8 hours   Refills:  0        diclofenac 1 % Gel topical gel   Commonly known as:  VOLTAREN   Used for:  Closed fracture of proximal end of left humerus, unspecified fracture morphology, initial encounter, Closed fracture of ramus of left pubis, initial encounter (H)        Dose:  2 g   Place 2 g onto the skin 4 times daily   Refills:  0        hydrOXYzine 25 MG tablet   Commonly known as:  ATARAX   Used for:  Closed fracture of ramus of left pubis, initial encounter (H), Closed fracture of proximal end of left humerus, unspecified fracture morphology, initial encounter        Dose:  25 mg   Take 1 tablet (25 mg) by mouth 3 times daily as needed (pain \"flare\")   Quantity:  120 tablet   Refills:  0        oxyCODONE IR 5 MG tablet   Commonly known as:  ROXICODONE   Used for:  Closed fracture of proximal end of left humerus, unspecified fracture morphology, initial encounter, Closed fracture of ramus of left pubis, initial encounter (H)        Dose:  10 mg   Take 2 tablets (10 mg) by mouth every 4 hours as needed for moderate to severe pain or breakthrough pain   Quantity:  40 tablet   Refills:  0        polyethylene glycol Packet   Commonly known as:  MIRALAX/GLYCOLAX   Used for:  Chronic back pain, unspecified back location, unspecified back pain laterality        Dose:  17 g   Take 17 g by mouth daily as needed for constipation   Quantity:  7 packet   Refills:  0          CONTINUE these medications which have NOT CHANGED        Dose / Directions Comments    atorvastatin 40 MG tablet   Commonly known as:  LIPITOR        Dose:  40 mg   Take 40 mg by mouth daily   Refills:  0        cyanocobalamin 1000 MCG/ML injection   Commonly known as:  VITAMIN B12        Dose:  1 mL   Inject 1 mL into the muscle every 21 days   Refills:  0        ferrous gluconate 324 (38 Fe) MG tablet   Commonly known as:  FERGON        Dose:  324 mg   Take 324 mg by mouth " daily (with breakfast)   Refills:  0        fexofenadine 180 MG tablet   Commonly known as:  ALLEGRA        Dose:  180 mg   Take 180 mg by mouth daily   Refills:  0        gabapentin 300 MG capsule   Commonly known as:  NEURONTIN        Dose:  1200 mg   Take 1,200 mg by mouth 3 times daily   Refills:  0        omeprazole 20 MG CR capsule   Commonly known as:  priLOSEC        Dose:  40 mg   Take 40 mg by mouth daily   Refills:  0        * OXcarbazepine 300 MG tablet   Commonly known as:  TRILEPTAL        Dose:  600 mg   Take 600 mg by mouth every morning   Refills:  0        * OXcarbazepine 300 MG tablet   Commonly known as:  TRILEPTAL        Dose:  300 mg   Take 300 mg by mouth every evening   Refills:  0        oxyCODONE-acetaminophen 7.5-325 MG per tablet   Commonly known as:  PERCOCET   Used for:  Chronic back pain, unspecified back location, unspecified back pain laterality        Dose:  1 tablet   Take 1 tablet by mouth 3 times daily for 21 days   Quantity:  63 tablet   Refills:  0        senna 8.6 MG tablet   Commonly known as:  SENOKOT        Dose:  1 tablet   Take 1 tablet by mouth daily   Refills:  0        sertraline 100 MG tablet   Commonly known as:  ZOLOFT        Dose:  150 mg   Take 150 mg by mouth daily   Refills:  0        tiZANidine 2 MG tablet   Commonly known as:  ZANAFLEX        Dose:  2 mg   Take 2 mg by mouth 3 times daily as needed for muscle spasms   Refills:  0        Vitamin D (Cholecalciferol) 1000 units Tabs        Dose:  3000 Units   Take 3,000 Units by mouth daily   Refills:  0        * Notice:  This list has 2 medication(s) that are the same as other medications prescribed for you. Read the directions carefully, and ask your doctor or other care provider to review them with you.            Summary of Visit     Reason for your hospital stay       Fall with left humeral neck fracture and pubic rami fracture on left             After Care     Activity - Up with assistive device        Weight bearing as tolerated.  Left arm in sling at all times (ma remove for bathing)       Activity - Up with nursing assistance           Advance Diet as Tolerated       Follow this diet upon discharge: Orders Placed This Encounter      Combination Diet Regular Diet Adult       Fall precautions           General info for SNF       Length of Stay Estimate: Short Term Care: Estimated # of Days <30  Condition at Discharge: Stable  Level of care:skilled   Rehabilitation Potential: Good  Admission H&P remains valid and up-to-date: Yes  Recent Chemotherapy: N/A  Use Nursing Home Standing Orders: Yes       Mantoux instructions       Give two-step Mantoux (PPD) Per Facility Policy Yes       Weight bearing status       As tolerated             Referrals     Occupational Therapy Adult Consult       Evaluate and treat as clinically indicated.    Reason:  Arm and pelvic fracture       Physical Therapy Adult Consult       Evaluate and treat as clinically indicated.    Reason:  Arm and pelvic fracture             Follow-Up Appointment Instructions     Future Labs/Procedures    Follow Up ([facility])     Comments:    Patient needs follow-up with orthopedic surgery, she prefers follow-up with the [facility].  Follow-up recommended in 10-14 days    Follow Up and recommended labs and tests     Comments:    Follow up with Nursing home physician.  No follow up labs or test are needed.      Follow-Up Appointment Instructions     Follow Up ([facility])       Patient needs follow-up with orthopedic surgery, she prefers follow-up with the [facility].  Follow-up recommended in 10-14 days       Follow Up and recommended labs and tests       Follow up with Nursing home physician.  No follow up labs or test are needed.             Statement of Approval     Ordered          10/27/18 1015  I have reviewed and agree with all the recommendations and orders detailed in this document.  EFFECTIVE NOW     Approved and electronically  signed by:  Neo Mora MD

## 2018-10-25 NOTE — H&P
[facility] Internal Medicine  History and Physical      Patient Name: Malou Mccollum MRN# [id number]   Age: 72 year old YOB: 1945     Date of Admission:10/25/2018    Primary care provider: Addis Adams  Date of Service: 10/25/2018         Assessment and Plan:   Malou Mccollum is a 72 year old female with a history of Fatty Liver, Osteoarthritis, DM Type 2, Peripheral Neuropathy, Gastric Bypass, ISABEL, Bipolar Disorder, GERD, HLD, Nephrolithiasis, Hyperparathyroidism, Back Pain, Tobacco Abuse, Allergic Rhinitis who presents to the ED today 2/2 pain after a fall.       Left Displaced Humeral Neck Fracture and Left Superior and Inferior Pubic Rami Fractures - s/p fall where patient lost her balance.  Denied any pre-syncopal symptoms and has baseline peripheral neuropathy and chronic knee and back pain which may have contributed to her fall.    - Orthopedic Surgery consult  - PT and SW consults  - pain control    Chronic Back and Knee Pain - hx of bilateral hip replacements, chronic knee pain, Osteoarthritis, chronic back pain.  Followed by [facility] in [place] and Dr. Harris with Orthopedic Surgery.  No new back or knee pain after her fall today.    - continue home Zanaflex, Gabapentin, Percocet    Diabetes Mellitus Type II with Peripheral Neuropathy - last hemoglobin A1C (8/2018) 6.2.  Diet controlled.  - start insulin sliding scale  - continue home Gabapentin    HLD - continue home Atorvastatin.  Hold ASA until seen by surgery.    Tobacco Abuse - smokes 1.5 ppd.  No prior hx of COPD.  Allergy to nicotine patches.    Iron Deficiency Anemia - hgb stable.  Continue home Iron supplements.    GERD - continue home Omeprazole    Allergic Rhinitis - continue home Allegra    Bipolar Disorder - reports mood has been stable.  Continue home Sertraline, Trileptal      CODE: Full.  Patient refused to answer this question stating her son should decide for her.  I called her son who stated the patient should be  Full code.  Patient was in the room at that time and agreed with his decision.  Will have patient clarify and document POA.  She will need a POLST filled out with her PCP.  Diet/IVF: regular, NS  GI ppx:  Omeprazole  DVT ppx: SCD    Patient discussed with Dr. Morgan Silva MS PA-C  Physician Assistant   Hospitalist Service  Pager: [phone]           Chief Complaint:   Left groin, hip and left arm pain s/p fall.         HPI:   72 year old female with a history of Fatty Liver, Osteoarthritis, DM Type 2, Peripheral Neuropathy, Gastric Bypass, ISABEL, Bipolar Disorder, GERD, HLD, Nephrolithiasis, Hyperparathyroidism, Back Pain, Tobacco Abuse, Allergic Rhinitis who presents to the ED today 2/2 pain after a fall.     Patient reports she woke up this morning and was sitting at the edge of her bed for a few minutes like she always does as she has chronic knee pain and waits a few minutes before she ambulates..  She did not feel lightheaded or dizzy.  She then got up and lost her balance falling on her left side and slid against a wall on her way down.  She did not hit her head or lose consciousness.  She was able to crawl to a phone to call her son and EMS.  She has been feeling pain in her left hip radiating in her groin and buttox area since as well as left shoulder and humerus pain. The patient notes that every time she moves, the pain worsens.  She denies chest pain, shortness of breath, abdominal pain, nausea, emesis, diarrhea, dysuria.  She lives alone and has been at her baseline recently.  She is followed by [facility] for her chronic pain and wonders if her peripheral neuropathy has been contributing to her balance issues.       ED work up revealed patient hypertensive 172/84, afebrile, on room air.  Laboratory work up revealed BMP, CBC, INR wnl.  Left Shoulder and Left Humerus Xrays revealed a moderately displaced fracture of the humeral neck.  Pelvis and Left Hip xrays revealed bilateral hip arthroplasties  "without evidence for complication. Minimally displaced fractures of the left superior and inferior pubic rami.  Patient received IV Dilaudid, Zofran and admitted for further management.         Past Medical History:     Past Medical History:   Diagnosis Date     Herniated disc, cervical    Fatty Liver, Osteoarthritis, DM Type 2, Peripheral Neuropathy, Gastric Bypass, ISABEL, Bipolar Disorder, GERD, HLD, Nephrolithiasis, Hyperparathyroidism, Back Pain, Tobacco Abuse, Allergic Rhinitis        Past Surgical History:     Past Surgical History:   Procedure Laterality Date     ORTHOPEDIC SURGERY            Social History:     Social History     Social History     Marital status:      Spouse name: N/A     Number of children: N/A     Years of education: N/A     Occupational History     Not on file.     Social History Main Topics     Smoking status: Current Every Day Smoker     Packs/day: 1.50     Smokeless tobacco: Never Used     Alcohol use No     Drug use: No     Sexual activity: Not on file     Other Topics Concern     Not on file     Social History Narrative     No narrative on file          Family History:   Father - CVA  Mother - Cataract, Glaucoma, Macular Degeneration       Allergies:      Allergies   Allergen Reactions     Aleve [Naproxen]      Penicillin G      Tape [Adhesive Tape]           Medications:   Pt is unsure of her medications.         Review of Systems:   A complete ROS was performed and is negative other than what is stated in the HPI.       Physical Exam:   Blood pressure 109/66, pulse 67, temperature 98.9  F (37.2  C), temperature source Oral, resp. rate 18, height 1.575 m (5' 2\"), weight 84.4 kg (186 lb), SpO2 93 %. on room air  General: Alert, interactive, NAD, pleasant and interactive.  Daughter in law at the bedside  HEENT: AT/NC, sclera anicteric, PERRL, EOMI  Chest/Resp: clear to auscultation bilaterally, no crackles or wheezes  Heart/CV: regular rate and rhythm, no murmur  Abdomen/GI: " Soft, nontender, nondistended. +BS.  No rebound or guarding.  Extremities/MSK: left arm in a sling.  Left upper shoulder pain.  Abrasions to the left forearm.  Left groin pain.  No lateral left hip pain or left femur pain.  FROM testing not performed.  Sensation grossly intact.    Neuro: Alert & oriented x 3         Labs:   ROUTINE ICU LABS (Last four results)  CMP  Recent Labs  Lab 10/25/18  0814      POTASSIUM 3.8   CHLORIDE 107   CO2 27   ANIONGAP 7   *   BUN 23   CR 0.77   GFRESTIMATED 73   GFRESTBLACK 89   BORA 8.6     CBC  Recent Labs  Lab 10/25/18  0814   WBC 6.6   RBC 4.46   HGB 13.9   HCT 42.1   MCV 94   MCH 31.2   MCHC 33.0   RDW 13.3        INR  Recent Labs  Lab 10/25/18  0814   INR 0.93     Arterial Blood GasNo lab results found in last 7 days.       Imaging/Procedures:     Results for orders placed or performed during the hospital encounter of 10/25/18   XR Pelvis and Hip Left 2 Views    Narrative    XR PELVIS AND HIP LEFT 2 VIEWS  10/25/2018 9:27 AM    HISTORY:  Trauma.    COMPARISON:  9/19/2018.      Impression    IMPRESSION:  Bilateral hip arthroplasties without evidence for  complication. Minimally displaced fractures of the left superior and  inferior pubic rami, new since the prior exam.    SIA SHI MD   Humerus XR,  G/E 2 views, left    Narrative    LEFT SHOULDER THREE OR MORE VIEWS, LEFT HUMERUS TWO OR MORE VIEWS   10/25/2018 9:26 AM     HISTORY: Trauma.     COMPARISON: None.      Impression    IMPRESSION: There is a moderately displaced fracture of the humeral  neck.   XR Shoulder Left G/E 3 Views    Narrative    LEFT SHOULDER THREE OR MORE VIEWS, LEFT HUMERUS TWO OR MORE VIEWS   10/25/2018 9:26 AM     HISTORY: Trauma.     COMPARISON: None.      Impression    IMPRESSION: There is a moderately displaced fracture of the humeral  neck.

## 2018-10-25 NOTE — PROGRESS NOTES
"SPIRITUAL HEALTH SERVICES  SPIRITUAL ASSESSMENT Progress Note  [facility] MS6    Visited this pt per consult order for support with EOL concerns and to discuss questions about code status.  Malou roused from sleep long enough to explain that \"I'm a nocturnal person.  I like to sleep during the day.  I'm awake at night.\"    Per her request, will attempt a visit on 10/26 in the morning.    Robinson Bolden M.Div.  Staff   Pager [phone]    "

## 2018-10-25 NOTE — IP AVS SNAPSHOT
MRN:[id number]                      After Visit Summary   10/25/2018    Malou Mccollum    MRN: [id number]           Thank you!     Thank you for choosing [facility] for your care. Our goal is always to provide you with excellent care. Hearing back from our patients is one way we can continue to improve our services. Please take a few minutes to complete the written survey that you may receive in the mail after you visit. If you would like to speak to someone directly about your visit please contact Patient Relations at [phone]. Thank you!          Patient Information     Date Of Birth          1945        Designated Caregiver       Most Recent Value    Caregiver    Will someone help with your care after discharge? no      About your hospital stay     You were admitted on:  October 25, 2018 You last received care in the:  [facility]    You were discharged on:  October 28, 2018        Reason for your hospital stay       Fall with left humeral neck fracture and pubic rami fracture on left                  Who to Call     For medical emergencies, please call 911.  For non-urgent questions about your medical care, please call your primary care provider or clinic, [phone]          Attending Provider     Provider Specialty    Miles Keane MD Emergency Medicine    Neo Mora MD Internal Medicine       Primary Care Provider Office Phone # Fax #    Addis Adams [phone]      After Care Instructions     Activity - Up with assistive device       Weight bearing as tolerated.  Left arm in sling at all times (ma remove for bathing)            Activity - Up with nursing assistance           Advance Diet as Tolerated       Follow this diet upon discharge: Orders Placed This Encounter      Combination Diet Regular Diet Adult            Fall precautions           General info for SNF       Length of Stay Estimate: Short Term Care:  "Estimated # of Days <30  Condition at Discharge: Stable  Level of care:skilled   Rehabilitation Potential: Good  Admission H&P remains valid and up-to-date: Yes  Recent Chemotherapy: N/A  Use Nursing Home Standing Orders: Yes            Mantoux instructions       Give two-step Mantoux (PPD) Per Facility Policy Yes            Weight bearing status       As tolerated                  Follow-up Appointments     Follow Up ([facility])       Patient needs follow-up with orthopedic surgery, she prefers follow-up with the [facility].  Follow-up recommended in 10-14 days            Follow Up and recommended labs and tests       Follow up with Nursing home physician.  No follow up labs or test are needed.                  Additional Services     Occupational Therapy Adult Consult       Evaluate and treat as clinically indicated.    Reason:  Arm and pelvic fracture            Physical Therapy Adult Consult       Evaluate and treat as clinically indicated.    Reason:  Arm and pelvic fracture                  Pending Results     No orders found from 10/23/2018 to 10/26/2018.            Statement of Approval     Ordered          10/28/18 0944  I have reviewed and agree with all the recommendations and orders detailed in this document.  EFFECTIVE NOW     Approved and electronically signed by:  Venice Mcgraw MD           10/27/18 1015  I have reviewed and agree with all the recommendations and orders detailed in this document.  EFFECTIVE NOW     Approved and electronically signed by:  Neo Mora MD             Admission Information     Date & Time Provider Department Dept. Phone    10/25/2018 Neo Mora MD [facility] [phone]      Your Vitals Were     Blood Pressure Pulse Temperature Respirations Height Weight    142/63 (BP Location: Right arm) 99 98.9  F (37.2  C) (Oral) 16 1.575 m (5' 2\") 89.4 kg (197 lb)    Pulse Oximetry BMI (Body Mass Index)                94% 36.03 kg/m2    " "      MyChart Information     DidLog lets you send messages to your doctor, view your test results, renew your prescriptions, schedule appointments and more. To sign up, go to [email or web address] . Click on \"Log in\" on the left side of the screen, which will take you to the Welcome page. Then click on \"Sign up Now\" on the right side of the page.     You will be asked to enter the access code listed below, as well as some personal information. Please follow the directions to create your username and password.     Your access code is: [email or web address]  Expires: 2018  6:38 PM     Your access code will  in 90 days. If you need help or a new code, please call your [facility] clinic or [phone].        Care EveryWhere ID     This is your Care EveryWhere ID. This could be used by other organizations to access your [facility] medical records  [id number]        Equal Access to Services     JONH DURÁN : Uli Elizondo, wajoan waddell, qaybta kaalmamarissa rangel, justice álvarez . So [facility] [place] [phone].    ATENCIÓN: Si nasir hurley, tiene a crystal disposición servicios gratuitos de asistencia lingüística. Llame al [phone].    We comply with applicable federal civil rights laws and [place] laws. We do not discriminate on the basis of race, color, national origin, age, disability, sex, sexual orientation, or gender identity.               Review of your medicines      START taking        Dose / Directions    acetaminophen 500 MG tablet   Commonly known as:  TYLENOL   Used for:  Closed fracture of proximal end of left humerus, unspecified fracture morphology, initial encounter, Closed fracture of ramus of left pubis, initial encounter (H)        Dose:  500 mg   Take 1 tablet (500 mg) by mouth every 8 hours   Refills:  0       diclofenac 1 % Gel topical gel   Commonly known as:  VOLTAREN   Used for:  Closed fracture of proximal end of left humerus, unspecified fracture " "morphology, initial encounter, Closed fracture of ramus of left pubis, initial encounter (H)        Dose:  2 g   Place 2 g onto the skin 4 times daily   Quantity:  1 Tube   Refills:  0       hydrOXYzine 25 MG tablet   Commonly known as:  ATARAX   Used for:  Closed fracture of ramus of left pubis, initial encounter (H), Closed fracture of proximal end of left humerus, unspecified fracture morphology, initial encounter        Dose:  25 mg   Take 1 tablet (25 mg) by mouth 3 times daily as needed (pain \"flare\")   Quantity:  120 tablet   Refills:  0       oxyCODONE IR 5 MG tablet   Commonly known as:  ROXICODONE   Used for:  Closed fracture of proximal end of left humerus, unspecified fracture morphology, initial encounter, Closed fracture of ramus of left pubis, initial encounter (H)        Dose:  10 mg   Take 2 tablets (10 mg) by mouth every 4 hours as needed for moderate to severe pain or breakthrough pain   Quantity:  40 tablet   Refills:  0       polyethylene glycol Packet   Commonly known as:  MIRALAX/GLYCOLAX   Used for:  Chronic back pain, unspecified back location, unspecified back pain laterality        Dose:  17 g   Take 17 g by mouth daily as needed for constipation   Quantity:  7 packet   Refills:  0         CONTINUE these medicines which have NOT CHANGED        Dose / Directions    atorvastatin 40 MG tablet   Commonly known as:  LIPITOR        Dose:  40 mg   Take 40 mg by mouth daily   Refills:  0       cyanocobalamin 1000 MCG/ML injection   Commonly known as:  VITAMIN B12        Dose:  1 mL   Inject 1 mL into the muscle every 21 days   Refills:  0       ferrous gluconate 324 (38 Fe) MG tablet   Commonly known as:  FERGON        Dose:  324 mg   Take 324 mg by mouth daily (with breakfast)   Refills:  0       fexofenadine 180 MG tablet   Commonly known as:  ALLEGRA        Dose:  180 mg   Take 180 mg by mouth daily   Refills:  0       gabapentin 300 MG capsule   Commonly known as:  NEURONTIN        Dose:  1200 " mg   Take 1,200 mg by mouth 3 times daily   Refills:  0       omeprazole 20 MG CR capsule   Commonly known as:  priLOSEC        Dose:  40 mg   Take 40 mg by mouth daily   Refills:  0       * OXcarbazepine 300 MG tablet   Commonly known as:  TRILEPTAL        Dose:  600 mg   Take 600 mg by mouth every morning   Refills:  0       * OXcarbazepine 300 MG tablet   Commonly known as:  TRILEPTAL        Dose:  300 mg   Take 300 mg by mouth every evening   Refills:  0       oxyCODONE-acetaminophen 7.5-325 MG per tablet   Commonly known as:  PERCOCET   Used for:  Chronic back pain, unspecified back location, unspecified back pain laterality        Dose:  1 tablet   Take 1 tablet by mouth 3 times daily for 21 days   Quantity:  63 tablet   Refills:  0       senna 8.6 MG tablet   Commonly known as:  SENOKOT        Dose:  1 tablet   Take 1 tablet by mouth daily   Refills:  0       sertraline 100 MG tablet   Commonly known as:  ZOLOFT        Dose:  150 mg   Take 150 mg by mouth daily   Refills:  0       tiZANidine 2 MG tablet   Commonly known as:  ZANAFLEX        Dose:  2 mg   Take 2 mg by mouth 3 times daily as needed for muscle spasms   Refills:  0       Vitamin D (Cholecalciferol) 1000 units Tabs        Dose:  3000 Units   Take 3,000 Units by mouth daily   Refills:  0       * Notice:  This list has 2 medication(s) that are the same as other medications prescribed for you. Read the directions carefully, and ask your doctor or other care provider to review them with you.         Where to get your medicines      Some of these will need a paper prescription and others can be bought over the counter. Ask your nurse if you have questions.     Bring a paper prescription for each of these medications     diclofenac 1 % Gel topical gel    oxyCODONE IR 5 MG tablet    oxyCODONE-acetaminophen 7.5-325 MG per tablet       You don't need a prescription for these medications     acetaminophen 500 MG tablet    hydrOXYzine 25 MG tablet     polyethylene glycol Packet                Protect others around you: Learn how to safely use, store and throw away your medicines at www.disposemymeds.org.        Information about OPIOIDS     PRESCRIPTION OPIOIDS: WHAT YOU NEED TO KNOW   We gave you an opioid (narcotic) pain medicine. It is important to manage your pain, but opioids are not always the best choice. You should first try all the other options your care team gave you. Take this medicine for as short a time (and as few doses) as possible.    Some activities can increase your pain, such as bandage changes or therapy sessions. It may help to take your pain medicine 30 to 60 minutes before these activities. Reduce your stress by getting enough sleep, working on hobbies you enjoy and practicing relaxation or meditation. Talk to your care team about ways to manage your pain beyond prescription opioids.    These medicines have risks:    DO NOT drive when on new or higher doses of pain medicine. These medicines can affect your alertness and reaction times, and you could be arrested for driving under the influence (DUI). If you need to use opioids long-term, talk to your care team about driving.    DO NOT operate heavy machinery    DO NOT do any other dangerous activities while taking these medicines.    DO NOT drink any alcohol while taking these medicines.     If the opioid prescribed includes acetaminophen, DO NOT take with any other medicines that contain acetaminophen. Read all labels carefully. Look for the word  acetaminophen  or  Tylenol.  Ask your pharmacist if you have questions or are unsure.    You can get addicted to pain medicines, especially if you have a history of addiction (chemical, alcohol or substance dependence). Talk to your care team about ways to reduce this risk.    All opioids tend to cause constipation. Drink plenty of water and eat foods that have a lot of fiber, such as fruits, vegetables, prune juice, apple juice and high-fiber  cereal. Take a laxative (Miralax, milk of magnesia, Colace, Senna) if you don t move your bowels at least every other day. Other side effects include upset stomach, sleepiness, dizziness, throwing up, tolerance (needing more of the medicine to have the same effect), physical dependence and slowed breathing.    Store your pills in a secure place, locked if possible. We will not replace any lost or stolen medicine. If you don t finish your medicine, please throw away (dispose) as directed by your pharmacist. The [place] Pollution [employer] Agency has more information about safe disposal: https://www.pca.[facility].mn.us/living-green/managing-unwanted-medications             Medication List: This is a list of all your medications and when to take them. Check marks below indicate your daily home schedule. Keep this list as a reference.      Medications           Morning Afternoon Evening Bedtime As Needed    acetaminophen 500 MG tablet   Commonly known as:  TYLENOL   Take 1 tablet (500 mg) by mouth every 8 hours   Last time this was given:  1,000 mg on 10/28/2018 12:59 PM                                atorvastatin 40 MG tablet   Commonly known as:  LIPITOR   Take 40 mg by mouth daily   Last time this was given:  40 mg on 10/28/2018  8:10 AM                                cyanocobalamin 1000 MCG/ML injection   Commonly known as:  VITAMIN B12   Inject 1 mL into the muscle every 21 days                                diclofenac 1 % Gel topical gel   Commonly known as:  VOLTAREN   Place 2 g onto the skin 4 times daily   Last time this was given:  2 g on 10/28/2018 12:59 PM                                ferrous gluconate 324 (38 Fe) MG tablet   Commonly known as:  FERGON   Take 324 mg by mouth daily (with breakfast)   Last time this was given:  324 mg on 10/28/2018  8:11 AM                                fexofenadine 180 MG tablet   Commonly known as:  ALLEGRA   Take 180 mg by mouth daily   Last time this was given:  180 mg on  "10/28/2018  8:12 AM                                gabapentin 300 MG capsule   Commonly known as:  NEURONTIN   Take 1,200 mg by mouth 3 times daily   Last time this was given:  1,200 mg on 10/28/2018  8:10 AM                                hydrOXYzine 25 MG tablet   Commonly known as:  ATARAX   Take 1 tablet (25 mg) by mouth 3 times daily as needed (pain \"flare\")   Last time this was given:  25 mg on 10/28/2018 12:59 PM                                omeprazole 20 MG CR capsule   Commonly known as:  priLOSEC   Take 40 mg by mouth daily   Last time this was given:  40 mg on 10/28/2018  8:10 AM                                * OXcarbazepine 300 MG tablet   Commonly known as:  TRILEPTAL   Take 600 mg by mouth every morning   Last time this was given:  600 mg on 10/28/2018  8:10 AM                                * OXcarbazepine 300 MG tablet   Commonly known as:  TRILEPTAL   Take 300 mg by mouth every evening   Last time this was given:  600 mg on 10/28/2018  8:10 AM                                oxyCODONE IR 5 MG tablet   Commonly known as:  ROXICODONE   Take 2 tablets (10 mg) by mouth every 4 hours as needed for moderate to severe pain or breakthrough pain   Last time this was given:  10 mg on 10/28/2018 12:59 PM                                oxyCODONE-acetaminophen 7.5-325 MG per tablet   Commonly known as:  PERCOCET   Take 1 tablet by mouth 3 times daily for 21 days   Last time this was given:  1 tablet on 10/26/2018  8:54 AM                                polyethylene glycol Packet   Commonly known as:  MIRALAX/GLYCOLAX   Take 17 g by mouth daily as needed for constipation                                senna 8.6 MG tablet   Commonly known as:  SENOKOT   Take 1 tablet by mouth daily   Last time this was given:  1 tablet on 10/28/2018  8:11 AM                                sertraline 100 MG tablet   Commonly known as:  ZOLOFT   Take 150 mg by mouth daily   Last time this was given:  150 mg on 10/28/2018  8:11 AM "                                tiZANidine 2 MG tablet   Commonly known as:  ZANAFLEX   Take 2 mg by mouth 3 times daily as needed for muscle spasms   Last time this was given:  2 mg on 10/27/2018  8:49 AM                                Vitamin D (Cholecalciferol) 1000 units Tabs   Take 3,000 Units by mouth daily   Last time this was given:  3,000 Units on 10/28/2018  8:11 AM                                * Notice:  This list has 2 medication(s) that are the same as other medications prescribed for you. Read the directions carefully, and ask your doctor or other care provider to review them with you.

## 2018-10-25 NOTE — IP AVS SNAPSHOT
` `       Patient Information     Patient Name Sex     Malou Mccollum ([id number]) Female 1945       Room Bed    44 0644-      Patient Demographics     Address Phone    [address] [address] [phone] (Home)  NONE (Work)  [phone] (Mobile) *Preferred*      Patient Ethnicity & Race     Ethnic Group Patient Race    American White      Emergency Contact(s)     Name Relation Home Work Mobile    Tony Walters [phone] NONE [phone]      Documents on File        Status Date Received Description       Documents for the Patient    Affiliate Privacy placeholder   phase3    Consent for EHR Access Received 18     Insurance Card Received 18     Patient ID Received 18     [facility] Specified Other       Privacy Notice - [place] Received 18     Consent for Services - Hospital and Clinic Received 18     HIE Auth Received 18     Insurance Card Received 18        Documents for the Encounter    CMS IM for Patient Signature Received 10/25/18     CMS IM for Patient Signature Received 10/28/18 [address] [place] [address]      Admission Information     Attending Provider Admitting Provider Admission Type Admission Date/Time    Neo Mora MD Kettler, Paul A, MD Emergency 10/25/18  0742    Discharge Date Hospital Service Auth/Cert Status Service Area     [facility]    Unit Room/Bed Admission Status        [facility] 0644- Admission (Confirmed)       Admission     Complaint    Fracture of pubic ramus, left, closed, initial encounter (H)      Hospital Account     Name Acct ID Class Status Primary Coverage    Malou Mccollum [id number] Inpatient Open MEDICARE - MEDICARE FOR HB SUPPLEMENT            Guarantor Account (for Hospital Account #[id number])     Name Relation to Pt Service Area Active? Acct Type    Malou Mccollum  FCS Yes Personal/Family    Address Phone          [address] [place] [id number] [phone](H)  NONE(O)              Coverage Information (for Hospital Account #[id number])     1. MEDICARE/MEDICARE FOR HB SUPPLEMENT     F/O Payor/Plan Precert #    MEDICARE/MEDICARE FOR HB SUPPLEMENT     Subscriber Subscriber #    Malou Mccollum [id number]    Address Phone    [employer]  [address]  [place] [address] [phone]          2. BCBS/BCBS PLATINUM BLUE     F/O Payor/Plan Precert #    BCBS/BCBS PLATINUM BLUE     Subscriber Subscriber #    Malou Mccollum [id number]    Address Phone    [address] [address]  [place] [address] [phone]

## 2018-10-25 NOTE — CONSULTS
Care Transitions Team: Following for CC, discharge planning, and disposition.        Per chart review MD has consulted [facility] for noted concerns of POLST and POA, spoke with PA , Lou Silva and she explained that a POLST could be done outpt. at PCP, unless Pain and Palliative end up involved with this pt.   But would like POA information, which is NOT related to medical decision making, a POA is a legal document granting authority for financial and property matters.     Per chart review and conversation with PA,   A Health Care directive/Agent  is the intention not POA.   Spiritual Health Consult placed for this reason.       Lou Bhardwaj RN, BSN, Care Transitions Specialist   Care Transitions Team  [phone]

## 2018-10-25 NOTE — ED PROVIDER NOTES
History     Chief Complaint:    Fall     HPI   Malou Mccollum is a 72 year old female who presents with fall via EMS. Upon arrival, EMS had given her 100 Fentanyl through IV. The patient reports that today around 0630, she was getting out of bed when she lost her balance and fell into the fall to the ground on her left side, in which she crawled to her land line to call 911. She has been feeling pain in her left hip radiating in her groin and buttox area since as well as left shoulder and humerus pain. The patient notes that every time she moves, the pain worsens. The patient denies fainting, loss of consciousness, or head trauma. 17 years ago the patient had her left hip replaced and it fractured a year and a half ago. She had her right hip replaced in April 2018 and had planned to have a second left hip replacement before the end of the year with Dr. Harris. On september 19, 2018, the patient had a left hip dislocation and was seen in the emergency department here, of which xray's were obtained.     The patient lives by herself in a one level.     Allergies:  Aleve  Penicillin  Adhesive Tape     Medications:    SENOKOT     aspirin   LIPITOR  Cyanocobalamin   FERGO  ALLEGRA  PRILOSEC  NEURONTIN  ZANAFLEX  TRILEPTAL     ZOLOFT     Past Medical History:    Herniated disc, cervical   Left and right hip replacements  Left hip fracture    Past Surgical History:    Orthopedic surgery     Family History:    Family history reviewed. No pertinent family history.     Social History:  The patient was accompanied to the ED by EMS, son arrived later.  Marital Status:      Review of Systems   Musculoskeletal:        Left hip and arm pain   Neurological:        No loss of consciousness  No fainting  No head trauma     All other systems reviewed and are negative.    Physical Exam     Patient Vitals for the past 24 hrs:   BP Temp Temp src Pulse Resp SpO2 Height Weight   10/25/18 1313 144/71 98.9  F (37.2  C) Oral 74  "16 94 % 1.575 m (5' 2\") 89.4 kg (197 lb)   10/25/18 1249 - - - - - 93 % - -   10/25/18 1242 - - - - - 92 % - -   10/25/18 1230 143/81 - - - - - - -   10/25/18 1000 - - - - - 93 % - -   10/25/18 0945 - - - - - 96 % - -   10/25/18 0937 109/66 - - - - 93 % - -   10/25/18 0845 - - - - - 95 % - -   10/25/18 0830 - - - - - (!) 84 % - -   10/25/18 0815 - - - - - 97 % - -   10/25/18 0800 - - - - - 95 % - -   10/25/18 0755 172/84 98.9  F (37.2  C) Oral 67 18 96 % 1.575 m (5' 2\") 84.4 kg (186 lb)     Physical Exam  General: Patient is alert and cooperative.  Uncomfortable.   HENT: no trauma.   Eyes: EOMI. Normal conjunctiva.  Neck:  Normal range of motion and appearance. No tenderness.   Cardiovascular:  Normal rate, regular rhythm; normal perfusion LUE.   Pulmonary/Chest:  Effort normal. Clear.   Abdominal: Soft. No distension or tenderness.     Musculoskeletal: limited ROM left shoulder.  No gross deformity.  Elbow/forearm/wrist atraumatic.  LUE normal appearing, but pain with movement.   Neurological: oriented, normal strength, sensation, and coordination.   Skin: Warm and dry. No rash or bruising.   Psychiatric: Normal mood and affect. Normal behavior and judgement.    Emergency Department Course     Imaging:  Radiology findings were communicated with the patient who voiced understanding of the findings.    XR Shoulder Left G/E 3 Views  There is a moderately displaced fracture of the humeral  neck.  STEPHANIE NIETO MD  Reading per radiology    Humerus XR,  G/E 2 views, left  There is a moderately displaced fracture of the humeral  neck.  STEPHANIE NIETO MD  Reading per radiology    XR Pelvis and Hip Left 2 Views  Bilateral hip arthroplasties without evidence for  complication. Minimally displaced fractures of the left superior and  inferior pubic rami, new since the prior exam.  SIA SHI MD  Reading per radiology    Laboratory:  Laboratory findings were communicated with the patient who voiced understanding of the " findings.    CBC: WBC 6.6, HGB 13.9,   BMP: Glucse 139 (H) o/w WNL (Creatinine 0.77)  INR: 0.93    Procedures:  Left shoulder immobilizer placed by ERT under my supervision.     Interventions:  0805 Dilaudid 1 mg IV  0806 Zofran 4 mg IV  0848 Dilaudid 0.5 mg IV  0954 Dilaudid 0.5 mg IV  1044 Dilaudid 0.5 mg IV  1205 Dilaudid 0.5 mg IV   1238 Benadryl 25 mg IV    Emergency Department Course:    0751 I performed an exam of the patient as documented above.     0755 Nursing notes and vitals reviewed.    0814 IV was inserted and blood was drawn for laboratory testing, results above.    0856 The patient was sent for a XR while in the emergency department, results above.    0950 Recheck and update.      1004 I spoke with Dr. Lou Silva of the hospitalist service from [facility] regarding patient's presentation, findings, and plan of care.    1025 Recheck and update.     1307 I personally reviewed the imaging and lab results with the patient and answered all related questions prior to admission.    Impression & Plan      Medical Decision Making:  Malou Mccollum is a 72 year old female who presents to the emergency department today for evaluation of injuries secondary to a mechanical fall this morning.  She comes in by ambulance with complaints of left arm and hip pain.  There was no syncopal episode and she has no headache or neck pain or neurologic symptoms.  X-rays have revealed a proximal humerus fracture and left pubic rami fractures.  She lives alone and cannot safely return to an independent environment.  A shoulder immobilizer was placed and she will be admitted to the hospitalist service and orthopedic consultation was obtained for assistance with fracture management and probable ultimate TCU placement.    Diagnosis:    ICD-10-CM    1. Closed fracture of proximal end of left humerus, unspecified fracture morphology, initial encounter S42.202A    2. Closed fracture of ramus of left pubis, initial  encounter (H) B00.194E      Disposition:   The patient is admitted into the care of Dr. Silva.    Scribe Disclosure:  I, Orla Severson, am serving as a scribe at 7:51 AM on 10/25/2018 to document services personally performed by Miles Keane MD based on my observations and the provider's statements to me.    [facility] EMERGENCY DEPARTMENT       Miles Keane MD  10/25/18 7832

## 2018-10-25 NOTE — IP AVS SNAPSHOT
` `     [facility]THO SP[facility]: [phone]            Medication Administration Report for Malou Mccollum as of 10/28/18 1324   Legend:    Given Hold Not Given Due Canceled Entry Other Actions    Time Time (Time) Time  Time-Action       Inactive    Active    Linked        Medications 10/22/18 10/23/18 10/24/18 10/25/18 10/26/18 10/27/18 10/28/18    acetaminophen (TYLENOL) tablet 1,000 mg  Dose: 1,000 mg  Freq: EVERY 8 HOURS Route: PO  Start: 10/26/18 1200   Admin Instructions: Alternate ibuprofen (if ordered) with acetaminophen.  Maximum acetaminophen dose from all sources = 75 mg/kg/day not to exceed 4 gram    Admin. Amount: 2 tablet (2 × 500 mg tablet)  Last Admin: 10/28/18 1259  Dispense Loc: [facility]         1350 (1,000 mg)-Given       2139 (1,000 mg)-Given        0707 (1,000 mg)-Given       1319 (1,000 mg)-Given       2100 (1,000 mg)-Given        0436 (1,000 mg)-Given       1259 (1,000 mg)-Given       [ ] 2100           atorvastatin (LIPITOR) tablet 40 mg  Dose: 40 mg  Freq: DAILY Route: PO  Start: 10/25/18 1415   Admin. Amount: 2 tablet (2 × 20 mg tablet)  Last Admin: 10/28/18 0810  Dispense Loc: [facility]        1449 (40 mg)-Given        0854 (40 mg)-Given        (0851)-Not Given [C]        0810 (40 mg)-Given           cholecalciferol (vitamin D3) tablet 3,000 Units  Dose: 3,000 Units  Freq: DAILY Route: PO  Start: 10/25/18 1415   Admin. Amount: 3 tablet (3 × 1,000 Units tablet)  Last Admin: 10/28/18 0811  Dispense Loc: [facility]        1448 (3,000 Units)-Given        0854 (3,000 Units)-Given        0850 (3,000 Units)-Given        0811 (3,000 Units)-Given           diclofenac (VOLTAREN) 1 % topical gel 2 g  Dose: 2 g  Freq: 4 TIMES DAILY Route: TD  Start: 10/26/18 1600   Admin Instructions: Apply to painful area of left arm  Send dosing card with product.    Admin. Amount: 2 g  Last Admin: 10/28/18 1259  Dispense Loc:  Main Pharmacy         1748 (2 g)-Given       2142 (2 g)-Given         0854 (2 g)-Given       1320 (2 g)-Given       1543 (2 g)-Given       1929 (2 g)-Given        0812 (2 g)-Given       1259 (2 g)-Given       [ ] 1600       [ ] 2000           diphenhydrAMINE (BENADRYL) capsule 25 mg  Dose: 25 mg  Freq: EVERY 6 HOURS PRN Route: PO  PRN Reason: itching  Start: 10/25/18 1411   Admin. Amount: 1 capsule (1 × 25 mg capsule)  Dispense Loc:  [facility]              Or  diphenhydrAMINE (BENADRYL) injection 25 mg  Dose: 25 mg  Freq: EVERY 6 HOURS PRN Route: IV  PRN Reason: itching  Start: 10/25/18 1411   Admin Instructions: For ordered IV doses 1-50 mg, give IV Push undiluted. Give each 25mg over a minimum of 1 minute. Extend in non-emergency    Admin. Amount: 25 mg = 0.5 mL Conc: 50 mg/mL  Dispense Loc:  Main Pharmacy  Volume: 1 mL               ferrous gluconate (FERGON) tablet 324 mg  Dose: 324 mg  Freq: DAILY WITH BREAKFAST Route: PO  Start: 10/26/18 0800   Admin Instructions: DO NOT CRUSH. Absorbed best on an empty stomach. If stomach upset occurs, can take with meals.    Admin. Amount: 1 tablet (1 × 324 mg tablet)  Last Admin: 10/28/18 0811  Dispense Loc:  [facility]         0855 (324 mg)-Given        0849 (324 mg)-Given        0811 (324 mg)-Given           fexofenadine (ALLEGRA) tablet 180 mg  Dose: 180 mg  Freq: DAILY Route: PO  Start: 10/25/18 1415   Admin. Amount: 1 tablet (1 × 180 mg tablet)  Last Admin: 10/28/18 0812  Dispense Loc:  [facility]        1449 (180 mg)-Given        0855 (180 mg)-Given        0849 (180 mg)-Given        0812 (180 mg)-Given           gabapentin (NEURONTIN) capsule 1,200 mg  Dose: 1,200 mg  Freq: 3 TIMES DAILY Route: PO  Start: 10/25/18 1415   Admin. Amount: 3 capsule (3 × 400 mg capsule)  Last Admin: 10/28/18 0810  Dispense Loc: [facility]        1448 (1,200 mg)-Given       2225 (1,200 mg)-Given        0855 (1,200 mg)-Given       1350 (1,200 mg)-Given       2141 (1,200 mg)-Given        0850 (1,200 mg)-Given       1317 (1,200 mg)-Given       1928 (1,200  "mg)-Given        0810 (1,200 mg)-Given       [ ] 1400       [ ] 2000           HYDROmorphone (PF) (DILAUDID) injection 0.3-0.5 mg  Dose: 0.3-0.5 mg  Freq: EVERY 2 HOURS PRN Route: IV  PRN Reason: moderate to severe pain  Start: 10/25/18 1309   Admin Instructions: Give for severe breakthrough pain, try oxycodone first.  For ordered IV doses 0.1-4 mg give IV Push undiluted. Administer each 2mg over 2-5 minutes.    Admin. Amount: 0.3-0.5 mg  Last Admin: 10/27/18 1317  Dispense Loc: [facility]        1422 (0.5 mg)-Given         0229 (0.5 mg)-Given       0435 (0.5 mg)-Given       0707 (0.5 mg)-Given       1317 (0.5 mg)-Given            hydrOXYzine (ATARAX) tablet 25 mg  Dose: 25 mg  Freq: 3 TIMES DAILY PRN Route: PO  PRN Comment: pain \"flare\"  Start: 10/26/18 1445   Admin Instructions: Watch for sedation    Admin. Amount: 1 tablet (1 × 25 mg tablet)  Last Admin: 10/28/18 1259  Dispense Loc: [facility]         1753 (25 mg)-Given        1317 (25 mg)-Given       2119 (25 mg)-Given        0436 (25 mg)-Given       1259 (25 mg)-Given           ketorolac (TORADOL) injection 15 mg  Dose: 15 mg  Freq: EVERY 6 HOURS PRN Route: IV  PRN Reason: moderate to severe pain  Start: 10/26/18 0910   Admin Instructions: Patient states allergy to Naproxen (reaction not specified): please monitor patient after Ketorolac is given  Can cause pain on injection.  Administer through a running maintenance fluid over 1 minute followed by a flush.  If patient complains of pain on injection, may dilute 15-30 mg in 5 mL and push over 1 to 2 minutes.      Admin. Amount: 15 mg = 1 mL Conc: 15 mg/mL  Last Admin: 10/27/18 0106  Dispense Loc: [facility]  Administrations Remaining: 3  Volume: 1 mL         1021 (15 mg)-Given        0106 (15 mg)-Given            lidocaine (LMX4) cream  Freq: EVERY 1 HOUR PRN Route: Top  PRN Reason: pain  PRN Comment: with VAD insertion or accessing implanted port.  Start: 10/25/18 8154   Admin Instructions: Do NOT give if " "patient has a history of allergy to any local anesthetic or any \"pradeep\" product.   Apply 30 minutes prior to VAD insertion or port access.  MAX Dose:  2.5 g (  of 5 g tube)    Dispense Loc: [facility]               lidocaine 1 % 1 mL  Dose: 1 mL  Freq: EVERY 1 HOUR PRN Route: OTHER  PRN Comment: mild pain with VAD insertion or accessing implanted port  Start: 10/25/18 1309   Admin Instructions: Do NOT give if patient has a history of allergy to any local anesthetic or any \"pradeep\" product. MAX dose 1 mL subcutaneous OR intradermal in divided doses.    Admin. Amount: 1 mL  Dispense Loc: [facility] Floor Stock  Volume: 2 mL               magnesium sulfate 4 g in 100 mL sterile water (premade)  Dose: 4 g  Freq: EVERY 4 HOURS PRN Route: IV  PRN Reason: magnesium supplementation  Start: 10/25/18 1309   Admin Instructions: For serum Mg++ less than 1.6 mg/dL  Give 4 g and recheck magnesium level 2 hours after dose, and next AM.    Admin. Amount: 4 g = 100 mL Conc: 4 g/100 mL  Dispense Loc:  Main Pharmacy  Infused Over: 120 Minutes  Volume: 100 mL               melatonin tablet 1 mg  Dose: 1 mg  Freq: AT BEDTIME PRN Route: PO  PRN Reason: sleep  Start: 10/25/18 1309   Admin Instructions: Do not give unless at least 6 hours of uninterrupted sleep is expected.    Admin. Amount: 1 tablet (1 × 1 mg tablet)  Dispense Loc: [facility]               naloxone (NARCAN) injection 0.1-0.4 mg  Dose: 0.1-0.4 mg  Freq: EVERY 2 MIN PRN Route: IV  PRN Reason: opioid reversal  Start: 10/25/18 1309   Admin Instructions: For respiratory rate LESS than or EQUAL to 8.  Partial reversal dose:  0.1 mg titrated q 2 minutes for Analgesia Side Effects Monitoring Sedation Level of 3 (frequently drowsy, arousable, drifts to sleep during conversation).Full reversal dose:  0.4 mg bolus for Analgesia Side Effects Monitoring Sedation Level of 4 (somnolent, minimal or no response to stimulation).  For ordered IV doses 0.1-2mg give IVP. Give each 0.4mg over 15 " seconds in emergency situations. For non-emergent situations further dilute in 9mL of NS to facilitate titration of response.    Admin. Amount: 0.1-0.4 mg = 0.25-1 mL Conc: 0.4 mg/mL  Dispense Loc: [facility]  Volume: 1 mL               omeprazole (priLOSEC) CR capsule 40 mg  Dose: 40 mg  Freq: DAILY Route: PO  Start: 10/25/18 1415   Admin. Amount: 2 capsule (2 × 20 mg capsule)  Last Admin: 10/28/18 0810  Dispense Loc: [facility]        1448 (40 mg)-Given        0854 (40 mg)-Given        0849 (40 mg)-Given        0810 (40 mg)-Given           ondansetron (ZOFRAN-ODT) ODT tab 4 mg  Dose: 4 mg  Freq: EVERY 6 HOURS PRN Route: PO  PRN Reasons: nausea,vomiting  Start: 10/25/18 1309   Admin Instructions: This is Step 1 of nausea and vomiting management.  If nausea not resolved in 15 minutes, go to Step 2 prochlorperazine (COMPAZINE). Do not push through foil backing. Peel back foil and gently remove. Place on tongue immediately. Administration with liquid unnecessary  With dry hands, peel back foil backing and gently remove tablet; do not push oral disintegrating tablet through foil backing; administer immediately on tongue and oral disintegrating tablet dissolves in seconds; then swallow with saliva; liquid not required.    Admin. Amount: 1 tablet (1 × 4 mg tablet)  Dispense Loc: [facility]              Or  ondansetron (ZOFRAN) injection 4 mg  Dose: 4 mg  Freq: EVERY 6 HOURS PRN Route: IV  PRN Reasons: nausea,vomiting  Start: 10/25/18 1309   Admin Instructions: This is Step 1 of nausea and vomiting management.  If nausea not resolved in 15 minutes, go to Step 2 prochlorperazine (COMPAZINE).  Irritant. For ordered IV doses 0.1-4 mg, give IV Push undiluted over 2-5 minutes.    Admin. Amount: 4 mg = 2 mL Conc: 4 mg/2 mL  Dispense Loc: [facility]  Infused Over: 2-5 Minutes  Volume: 2 mL               OXcarbazepine (TRILEPTAL) tablet 300 mg  Dose: 300 mg  Freq: EVERY EVENING Route: PO  Start: 10/25/18 2000   Admin. Amount:  1 tablet (1 × 300 mg tablet)  Last Admin: 10/27/18 1929  Dispense Loc: [facility]        2018 (300 mg)-Given        2140 (300 mg)-Given        1929 (300 mg)-Given        [ ] 2000           OXcarbazepine (TRILEPTAL) tablet 600 mg  Dose: 600 mg  Freq: EVERY MORNING Route: PO  Start: 10/26/18 0800   Admin. Amount: 2 tablet (2 × 300 mg tablet)  Last Admin: 10/28/18 0810  Dispense Loc: [facility]         0855 (600 mg)-Given        0849 (600 mg)-Given        0810 (600 mg)-Given           oxyCODONE IR (ROXICODONE) tablet 5-10 mg  Dose: 5-10 mg  Freq: EVERY 4 HOURS PRN Route: PO  PRN Reasons: moderate to severe pain,breakthrough pain  Start: 10/26/18 0920   Admin Instructions: Try before giving IV Dilaudid    Admin. Amount: 1-2 tablet (1-2 × 5 mg tablet)  Last Admin: 10/28/18 1259  Dispense Loc: [facility]         1110 (10 mg)-Given       1545 (10 mg)-Given       2157 (10 mg)-Given        0153 (10 mg)-Given       0850 (10 mg)-Given       1542 (10 mg)-Given       1928 (10 mg)-Given        0055 (10 mg)-Given       0508 (10 mg)-Given       0929 (10 mg)-Given       1259 (10 mg)-Given           polyethylene glycol (MIRALAX/GLYCOLAX) Packet 17 g  Dose: 17 g  Freq: DAILY PRN Route: PO  PRN Reason: constipation  Start: 10/25/18 1309   Admin Instructions: Give in 8oz of  water, juice, or soda. Hold for loose stools.  This is the second step of a three step constipation treatment.  1 Packet = 17 grams. Mixed prescribed dose in 8 ounces of water. Follow with 8 oz. of water.    Admin. Amount: 17 g  Dispense Loc: [facility]               potassium chloride (KLOR-CON) Packet 20-40 mEq  Dose: 20-40 mEq  Freq: EVERY 2 HOURS PRN Route: ORAL OR FEED  PRN Reason: potassium supplementation  Start: 10/25/18 1309   Admin Instructions: Use if unable to tolerate tablets.  If Serum K+ 3.0-3.3, dose = 60 mEq po total dose (40 mEq x1 followed in 2 hours by 20 mEq x1). Recheck K+ level 4 hours after dose and the next AM.  If Serum K+ 2.5-2.9, dose  = 80 mEq po total dose (40 mEq Q2H x2). Recheck K+ level 4 hours after dose and the next AM.  If Serum K+ less than 2.5, See IV order.  Dissolve packet contents in 4-8 ounces of cold water or juice.    Admin. Amount: 20-40 mEq  Dispense Loc:  [facility]               potassium chloride 10 mEq in 100 mL intermittent infusion with 10 mg lidocaine  Dose: 10 mEq  Freq: EVERY 1 HOUR PRN Route: IV  PRN Reason: potassium supplementation  Start: 10/25/18 1309   Admin Instructions: Infuse via PERIPHERAL LINE. Use potassium with lidocaine for pain with peripheral administration.  If Serum K+ 3.0-3.3, dose = 10 mEq/hr x4 doses (40 mEq IV total dose). Recheck K+ level 2 hours after dose and the next AM.  If Serum K+ less than 3.0, dose = 10 mEq/hr x6 doses (60 mEq IV total dose). Recheck K+ level 2 hours after dose and the next AM.    Admin. Amount: 10 mEq = 100 mL Conc: 10 mEq/100 mL  Dispense Loc:  Main Pharmacy  Infused Over: 1 Hours  Volume: 100 mL               potassium chloride 10 mEq in 100 mL sterile water intermittent infusion (premix)  Dose: 10 mEq  Freq: EVERY 1 HOUR PRN Route: IV  PRN Reason: potassium supplementation  Start: 10/25/18 1309   Admin Instructions: Infuse via PERIPHERAL LINE or CENTRAL LINE. Use for central line replacement if patient weight less than 65 kg, if patient is on TPN with high potassium content or if unit does not stock 20 mEq bags.   If Serum K+ 3.0-3.3, dose = 10 mEq/hr x4 doses (40 mEq IV total dose). Recheck K+ level 2 hours after dose and the next AM.   If Serum K+ less than 3.0, dose = 10 mEq/hr x6 doses (60 mEq IV total dose). Recheck K+ level 2 hours after dose and the next AM.    Admin. Amount: 10 mEq = 100 mL Conc: 10 mEq/100 mL  Dispense Loc:  Main Pharmacy  Infused Over: 60 Minutes  Volume: 100 mL               potassium chloride 20 mEq in 50 mL intermittent infusion  Dose: 20 mEq  Freq: EVERY 1 HOUR PRN Route: IV  PRN Reason: potassium supplementation  Start: 10/25/18 7569    Admin Instructions: Infuse via CENTRAL LINE Only. May need EKG if less than 65 kg or on TPN - Max rate is 0.3 mEq/kg/hr for patients not on EKG monitoring.   If Serum K+ 3.0-3.3, dose = 20 mEq/hr x2 doses (40 mEq IV total dose). Recheck K+ level 2 hours after dose and the next AM.  If Serum K+ less than 3.0, dose = 20 mEq/hr x3 doses (60 mEq IV total dose). Recheck K+ level 2 hours after dose and the next AM.    Admin. Amount: 20 mEq = 50 mL Conc: 20 mEq/50 mL  Dispense Loc:  Main Pharmacy  Volume: 50 mL               potassium chloride SA (K-DUR/KLOR-CON M) CR tablet 20-40 mEq  Dose: 20-40 mEq  Freq: EVERY 2 HOURS PRN Route: PO  PRN Reason: potassium supplementation  Start: 10/25/18 1309   Admin Instructions: Use if able to take PO.   If Serum K+ 3.0-3.3, dose = 60 mEq po total dose (40 mEq x1 followed in 2 hours by 20 mEq x1). Recheck K+ level 4 hours after dose and the next AM.  If Serum K+ 2.5-2.9, dose = 80 mEq po total dose (40 mEq Q2H x2). Recheck K+ level 4 hours after dose and the next AM.  If Serum K+ less than 2.5, See IV order.  DO NOT CRUSH    Admin. Amount: 1-2 tablet (1-2 × 20 mEq tablet)  Dispense Loc:  [facility]               senna-docusate (SENOKOT-S;PERICOLACE) 8.6-50 MG per tablet 1 tablet  Dose: 1 tablet  Freq: 2 TIMES DAILY PRN Route: PO  PRN Reason: constipation  Start: 10/25/18 1309   Admin Instructions: If no bowel movement in 24 hours, increase to 2 tablets PO.  Hold for loose stools.  This is the first step of a three step constipation treatment.    Admin. Amount: 1 tablet  Dispense Loc:  [facility]                     Or  senna-docusate (SENOKOT-S;PERICOLACE) 8.6-50 MG per tablet 2 tablet  Dose: 2 tablet  Freq: 2 TIMES DAILY PRN Route: PO  PRN Reason: constipation  Start: 10/25/18 1309   Admin Instructions: Hold for loose stools.  This is the first step of a three step constipation treatment.    Admin. Amount: 2 tablet  Last Admin: 10/26/18 4664  Dispense Loc:  [facility]          2140 (2 tablet)-Given             sennosides (SENOKOT) tablet 1 tablet  Dose: 1 tablet  Freq: 2 TIMES DAILY Route: PO  Start: 10/25/18 2000   Admin. Amount: 1 tablet  Last Admin: 10/28/18 0811  Dispense Loc:  [facility]        2018 (1 tablet)-Given        0855 (1 tablet)-Given       2143 (1 tablet)-Given [C]        0851 (1 tablet)-Given       1929 (1 tablet)-Given        0811 (1 tablet)-Given       [ ] 2000           sertraline (ZOLOFT) tablet 150 mg  Dose: 150 mg  Freq: DAILY Route: PO  Start: 10/25/18 1415   Admin. Amount: 1 tablet (1 × 50 mg tablet) + 1 tablet (1 × 100 mg tablet)  Last Admin: 10/28/18 0811  Dispense Loc:  [facility]   Mixture Administration Information:   Medication Type Amount   sertraline 50 MG TABS Medications 50 mg   sertraline 100 MG TABS Medications 100 mg                1449 (150 mg)-Given        0856 (150 mg)-Given        0849 (150 mg)-Given        0811 (150 mg)-Given           sodium chloride (PF) 0.9% PF flush 3 mL  Dose: 3 mL  Freq: EVERY 8 HOURS Route: IK  Start: 10/25/18 1315   Admin Instructions: And Q1H PRN, to lock peripheral IV dormant line.    Admin. Amount: 3 mL  Last Admin: 10/28/18 0101  Dispense Loc: [facility] Floor Stock  Volume: 4 mL        (1403)-Not Given       2016 (3 mL)-Given        0606 (3 mL)-Given       0906 (3 mL)-Given              2143 (3 mL)-Given               1322 (3 mL)-Given       2100 (3 mL)-Given        0101 (3 mL)-Given              [ ] 1315       [ ] 2115           sodium chloride (PF) 0.9% PF flush 3 mL  Dose: 3 mL  Freq: EVERY 1 HOUR PRN Route: IK  PRN Reason: line flush  PRN Comment: for peripheral IV flush post IV meds  Start: 10/25/18 1309   Admin. Amount: 3 mL  Dispense Loc: [facility] Floor Stock  Volume: 4 mL               tiZANidine (ZANAFLEX) tablet 2 mg  Dose: 2 mg  Freq: 3 TIMES DAILY PRN Route: PO  PRN Reason: muscle spasms  Start: 10/25/18 1413   Admin. Amount: 1 tablet (1 × 2 mg tablet)  Last Admin: 10/27/18 1341  Dispense Loc:  [facility]           0849 (2 mg)-Given           Completed Medications  Medications 10/22/18 10/23/18 10/24/18 10/25/18 10/26/18 10/27/18 10/28/18         Dose: 0.5 mL  Freq: PRIOR TO DISCHARGE Route: IM  Start: 10/26/18 1000   End: 10/28/18 0929   Admin Instructions: Administer when afebrile (less than 100.4F) x 24 hours, or Prior to Discharge.  If not administering when scheduled , change the due time by following the instructions in the reference link below.  If patient refuses vaccine, chart as Vaccine Refused.    Admin. Amount: 0.5 mL  Last Admin: 10/28/18 0929  Dispense Loc:  Main Pharmacy  Administrations Remainin  Volume: 0.5 mL           929 (0.5 mL)-Given          Discontinued Medications  Medications 10/22/18 10/23/18 10/24/18 10/25/18 10/26/18 10/27/18 10/28/18         Dose: 650 mg  Freq: EVERY 4 HOURS PRN Route: PO  PRN Reason: mild pain  Start: 10/25/18 1309   End: 10/26/18 115   Admin Instructions: Alternate ibuprofen (if ordered) with acetaminophen.  Maximum acetaminophen dose from all sources = 75 mg/kg/day not to exceed 4 grams/day.    Admin. Amount: 2 tablet (2 × 325 mg tablet)  Dispense Loc:  [facility]         1150-Med Discontinued           Dose: 5 mg  Freq: EVERY 4 HOURS PRN Route: PO  PRN Reasons: moderate to severe pain,breakthrough pain  Start: 10/25/18 1517   End: 10/26/18 0920   Admin Instructions: Try before giving IV Dilaudid    Admin. Amount: 1 tablet (1 × 5 mg tablet)  Last Admin: 10/26/18 0438  Dispense Loc:  [facility]        2359 (5 mg)-Given        0438 (5 mg)-Given       0920-Med Discontinued           Dose: 1 tablet  Freq: 3 TIMES DAILY Route: PO  Start: 10/25/18 1530   End: 10/26/18 1150   Admin. Amount: 1 tablet  Last Admin: 10/26/18 0854  Dispense Loc:  [facility]        1621 (1 tablet)-Given       1928 (1 tablet)-Given        0854 (1 tablet)-Given       1150-Med Discontinued           Dose: 1 tablet  Freq: EVERY 6 HOURS PRN Route: PO  PRN Reason: moderate to severe pain  Start: 10/25/18  1420   End: 10/25/18 1515   Admin. Amount: 1 tablet  Dispense Loc:  Main Pharmacy        1515-Med Discontinued            Dose: 1 tablet  Freq: DAILY Route: PO  Start: 10/25/18 1415   End: 10/25/18 1520   Admin. Amount: 1 tablet  Last Admin: 10/25/18 1449  Dispense Loc: [facility]        1449 (1 tablet)-Given       1520-Med Discontinued            Rate: 100 mL/hr   Freq: CONTINUOUS Route: IV  Start: 10/25/18 1315   End: 10/25/18 2314   Last Admin: 10/25/18 2016  Dispense Loc: [facility] Floor Stock  Volume: 1,000 mL        1326 ( )-New Bag       1920-Stopped [C]       2016 ( )-Restarted       2314-Med Discontinued       Medications 10/22/18 10/23/18 10/24/18 10/25/18 10/26/18 10/27/18 10/28/18

## 2018-10-25 NOTE — ED NOTES
Pt c/o itching after dilaudid given, c/o itching before the dose was given at the IV but sure it was the medication or the dressing from EMS.

## 2018-10-25 NOTE — PLAN OF CARE
Problem: Patient Care Overview  Goal: Plan of Care/Patient Progress Review  Outcome: No Change    VS: Temp: 98.9  F (37.2  C) Temp src: Oral BP: 144/71 Pulse: 74   Resp: 16 SpO2: 94 % O2 Device: Nasal cannula 3L   Cardio: WNL   Neuro: Lethargic, often falling asleep in conversation, CMS intact ex LUE ROM severely impaired, pt states numbness/tingling in all extremities   Resp: 3L via NC, no SOB reported  GI/: DTV   Skin: Abrasion on LUE, mepalex placed  Activity: Bedrest   Diet: Reg   IVs/lines: IVF @ 100 ml/hr  Meds: 0.5 mg IV Dilaudid given x1 d/t pt stating pain in 10/10   Plan:  PT/OT/Social/Ortho/Pain consulted; continue plan of care

## 2018-10-26 ENCOUNTER — APPOINTMENT (OUTPATIENT)
Dept: PHYSICAL THERAPY | Facility: CLINIC | Age: 73
DRG: 563 | End: 2018-10-26
Payer: MEDICARE

## 2018-10-26 LAB — MAGNESIUM SERPL-MCNC: 1.8 MG/DL (ref 1.6–2.3)

## 2018-10-26 PROCEDURE — 25000132 ZZH RX MED GY IP 250 OP 250 PS 637: Mod: GY | Performed by: NURSE PRACTITIONER

## 2018-10-26 PROCEDURE — 83735 ASSAY OF MAGNESIUM: CPT | Performed by: INTERNAL MEDICINE

## 2018-10-26 PROCEDURE — 99232 SBSQ HOSP IP/OBS MODERATE 35: CPT | Performed by: INTERNAL MEDICINE

## 2018-10-26 PROCEDURE — 97161 PT EVAL LOW COMPLEX 20 MIN: CPT | Mod: GP | Performed by: PHYSICAL THERAPIST

## 2018-10-26 PROCEDURE — A9270 NON-COVERED ITEM OR SERVICE: HCPCS | Mod: GY | Performed by: PHYSICIAN ASSISTANT

## 2018-10-26 PROCEDURE — A9270 NON-COVERED ITEM OR SERVICE: HCPCS | Mod: GY | Performed by: INTERNAL MEDICINE

## 2018-10-26 PROCEDURE — 97530 THERAPEUTIC ACTIVITIES: CPT | Mod: GP | Performed by: PHYSICAL THERAPIST

## 2018-10-26 PROCEDURE — 12000000 ZZH R&B MED SURG/OB

## 2018-10-26 PROCEDURE — 40000894 ZZH STATISTIC OT IP EVAL DEFER: Performed by: REHABILITATION PRACTITIONER

## 2018-10-26 PROCEDURE — A9270 NON-COVERED ITEM OR SERVICE: HCPCS | Mod: GY | Performed by: NURSE PRACTITIONER

## 2018-10-26 PROCEDURE — 40000193 ZZH STATISTIC PT WARD VISIT: Performed by: PHYSICAL THERAPIST

## 2018-10-26 PROCEDURE — 97110 THERAPEUTIC EXERCISES: CPT | Mod: GP | Performed by: PHYSICAL THERAPIST

## 2018-10-26 PROCEDURE — 99223 1ST HOSP IP/OBS HIGH 75: CPT | Performed by: NURSE PRACTITIONER

## 2018-10-26 PROCEDURE — 25000128 H RX IP 250 OP 636: Performed by: PHYSICIAN ASSISTANT

## 2018-10-26 PROCEDURE — 25000132 ZZH RX MED GY IP 250 OP 250 PS 637: Mod: GY | Performed by: PHYSICIAN ASSISTANT

## 2018-10-26 PROCEDURE — 36415 COLL VENOUS BLD VENIPUNCTURE: CPT | Performed by: INTERNAL MEDICINE

## 2018-10-26 PROCEDURE — 25000132 ZZH RX MED GY IP 250 OP 250 PS 637: Mod: GY | Performed by: INTERNAL MEDICINE

## 2018-10-26 RX ORDER — OXYCODONE HYDROCHLORIDE 5 MG/1
5-10 TABLET ORAL EVERY 4 HOURS PRN
Status: DISCONTINUED | OUTPATIENT
Start: 2018-10-26 | End: 2018-10-28 | Stop reason: HOSPADM

## 2018-10-26 RX ORDER — ACETAMINOPHEN 500 MG
1000 TABLET ORAL EVERY 8 HOURS
Status: DISCONTINUED | OUTPATIENT
Start: 2018-10-26 | End: 2018-10-28 | Stop reason: HOSPADM

## 2018-10-26 RX ORDER — KETOROLAC TROMETHAMINE 15 MG/ML
15 INJECTION, SOLUTION INTRAMUSCULAR; INTRAVENOUS EVERY 6 HOURS PRN
Status: DISCONTINUED | OUTPATIENT
Start: 2018-10-26 | End: 2018-10-28 | Stop reason: HOSPADM

## 2018-10-26 RX ORDER — HYDROXYZINE HYDROCHLORIDE 25 MG/1
25 TABLET, FILM COATED ORAL 3 TIMES DAILY PRN
Status: DISCONTINUED | OUTPATIENT
Start: 2018-10-26 | End: 2018-10-28 | Stop reason: HOSPADM

## 2018-10-26 RX ADMIN — SENNOSIDES 1 TABLET: 8.6 TABLET, FILM COATED ORAL at 21:43

## 2018-10-26 RX ADMIN — SERTRALINE HYDROCHLORIDE 150 MG: 100 TABLET ORAL at 08:56

## 2018-10-26 RX ADMIN — OXYCODONE HYDROCHLORIDE 10 MG: 5 TABLET ORAL at 15:45

## 2018-10-26 RX ADMIN — OXYCODONE HYDROCHLORIDE 5 MG: 5 TABLET ORAL at 04:38

## 2018-10-26 RX ADMIN — VITAMIN D, TAB 1000IU (100/BT) 3000 UNITS: 25 TAB at 08:54

## 2018-10-26 RX ADMIN — OMEPRAZOLE 40 MG: 20 CAPSULE, DELAYED RELEASE ORAL at 08:54

## 2018-10-26 RX ADMIN — ATORVASTATIN CALCIUM 40 MG: 20 TABLET, FILM COATED ORAL at 08:54

## 2018-10-26 RX ADMIN — HYDROXYZINE HYDROCHLORIDE 25 MG: 25 TABLET ORAL at 17:53

## 2018-10-26 RX ADMIN — ACETAMINOPHEN 1000 MG: 500 TABLET, FILM COATED ORAL at 21:39

## 2018-10-26 RX ADMIN — DICLOFENAC SODIUM 2 G: 10 GEL TOPICAL at 21:42

## 2018-10-26 RX ADMIN — KETOROLAC TROMETHAMINE 15 MG: 15 INJECTION, SOLUTION INTRAMUSCULAR; INTRAVENOUS at 10:21

## 2018-10-26 RX ADMIN — FEXOFENADINE HYDROCHLORIDE 180 MG: 180 TABLET, FILM COATED ORAL at 08:55

## 2018-10-26 RX ADMIN — ACETAMINOPHEN 1000 MG: 500 TABLET, FILM COATED ORAL at 13:50

## 2018-10-26 RX ADMIN — GABAPENTIN 1200 MG: 400 CAPSULE ORAL at 08:55

## 2018-10-26 RX ADMIN — OXCARBAZEPINE 600 MG: 300 TABLET ORAL at 08:55

## 2018-10-26 RX ADMIN — OXYCODONE HYDROCHLORIDE 10 MG: 5 TABLET ORAL at 21:57

## 2018-10-26 RX ADMIN — OXYCODONE AND ACETAMINOPHEN 1 TABLET: 7.5; 325 TABLET ORAL at 08:54

## 2018-10-26 RX ADMIN — SENNOSIDES AND DOCUSATE SODIUM 2 TABLET: 8.6; 5 TABLET ORAL at 21:40

## 2018-10-26 RX ADMIN — OXCARBAZEPINE 300 MG: 300 TABLET ORAL at 21:40

## 2018-10-26 RX ADMIN — OXYCODONE HYDROCHLORIDE 10 MG: 5 TABLET ORAL at 11:10

## 2018-10-26 RX ADMIN — GABAPENTIN 1200 MG: 400 CAPSULE ORAL at 21:41

## 2018-10-26 RX ADMIN — SENNOSIDES 1 TABLET: 8.6 TABLET, FILM COATED ORAL at 08:55

## 2018-10-26 RX ADMIN — DICLOFENAC SODIUM 2 G: 10 GEL TOPICAL at 17:48

## 2018-10-26 RX ADMIN — GABAPENTIN 1200 MG: 400 CAPSULE ORAL at 13:50

## 2018-10-26 RX ADMIN — FERROUS GLUCONATE 324 MG: 324 TABLET ORAL at 08:55

## 2018-10-26 ASSESSMENT — ACTIVITIES OF DAILY LIVING (ADL)
ADLS_ACUITY_SCORE: 12

## 2018-10-26 NOTE — PROGRESS NOTES
[facility]    Hospitalist Progress Note    Date of Service (when I saw the patient): 10/26/2018    Assessment & Plan   Malou Mccollum is a 72 year old female with a history of Fatty Liver, Osteoarthritis, DM Type 2, Peripheral Neuropathy, Gastric Bypass, ISABEL, Bipolar Disorder, GERD, HLD, Nephrolithiasis, Hyperparathyroidism, chronic Back Pain on chronic opioids, Tobacco Abuse, Allergic Rhinitis who presents to the ED today 2/2 pain after a fall.         Left Displaced Humeral Neck Fracture and Left Superior and Inferior Pubic Rami Fractures - s/p fall where patient lost her balance.  Denied any pre-syncopal symptoms and has baseline peripheral neuropathy and chronic knee and back pain which may have contributed to her fall.    - Orthopedic Surgery consult apprciated  - PT and SW consults  - pain control, pain team consulted. Restarted her home regimen of oxycodone 7.5 mg with medications for breakthrough  - WBAT  - no surgery on left arm.  - expect likely need for STR.     Chronic Back and Knee Pain - hx of bilateral hip replacements, chronic knee pain, Osteoarthritis, chronic back pain.  Followed by [facility] in [place] and Dr. Harris with Orthopedic Surgery.  No new back or knee pain after her fall today.    - continue home Zanaflex, Gabapentin, Percocet     Diabetes Mellitus Type II with Peripheral Neuropathy - last hemoglobin A1C (8/2018) 6.2.  Diet controlled.  - start insulin sliding scale  -  Gabapentin     HLD - Atorvastatin.  ok to resume asa      Tobacco Abuse with hypoxia- smokes 1.5 ppd.  No prior hx of COPD.  Allergy to nicotine patches.  - did have some transient hypoxia, states she is normally at 88% on room air and had a normal cxr last month (reviewed and was essentially normal)  - wean oxygen as able and if dips consider cxr,  - IS  - opioids may also be contributing.     Iron Deficiency Anemia - hgb stable.  Continue home Iron supplements.     GERD - Omeprazole     Allergic Rhinitis -  Allegra     Bipolar Disorder - reports mood has been stable.  Sertraline, Trileptal        CODE: Full. Full code.  Patient was in the room at that time and agreed with his decision.   .    DVT ppx: SCD    Disposition Plan   Expected discharge in 1-2 days to transitional care unit once pain is under better control and increase activity.     Entered: Neo Mora 10/26/2018, 9:12 AM         Neo Mora MD  Text Page    Interval History   Patient with ongoing left arm pain, hurts to move and hurts at rest.  Has not been up yet.  Denies nausea or short of breath.  No chest pain.  No fever or chills      -Data reviewed today: I reviewed all new labs and imaging results over the last 24 hours. I personally reviewed no images or EKG's today.    Physical Exam   Temp: 99.8  F (37.7  C) Temp src: Oral BP: 152/64 Pulse: 69   Resp: 16 SpO2: 92 % O2 Device: Nasal cannula Oxygen Delivery: 2 LPM  Vitals:    10/25/18 0755 10/25/18 1313   Weight: 84.4 kg (186 lb) 89.4 kg (197 lb)     Vital Signs with Ranges  Temp:  [97.2  F (36.2  C)-99.8  F (37.7  C)] 99.8  F (37.7  C)  Pulse:  [61-74] 69  Resp:  [14-16] 16  BP: (109-165)/(58-81) 152/64  SpO2:  [88 %-96 %] 92 %  I/O last 3 completed shifts:  In: 883 [P.O.:120; I.V.:763]  Out: -     Constitutional: Awake, alert, cooperative, no apparent distress, gets a little agitated when asked questions related to pain and breathing.   Respiratory: Clear to auscultation bilaterally, no crackles or wheezing   Cardiovascular: Regular rate and rhythm, normal S1 and S2, and no murmur noted   Abdomen: Normal bowel sounds, soft, non-distended, non-tender, no hepatosplenomegaly    Skin: No rashes, no cyanosis, dry to touch   Neuro: Alert and oriented x3,    Extremities: No edema, normal range of motion   Other(s):        All other systems: Negative       Medications       atorvastatin  40 mg Oral Daily     cholecalciferol  3,000 Units Oral Daily     ferrous gluconate  324 mg Oral Daily with  breakfast     fexofenadine  180 mg Oral Daily     gabapentin  1,200 mg Oral TID     influenza Vac Split High-Dose  0.5 mL Intramuscular Prior to discharge     omeprazole  40 mg Oral Daily     OXcarbazepine  300 mg Oral QPM     OXcarbazepine  600 mg Oral QAM     oxyCODONE-acetaminophen  1 tablet Oral TID     sennosides  1 tablet Oral BID     sertraline  150 mg Oral Daily     sodium chloride (PF)  3 mL Intracatheter Q8H       Data     Recent Labs  Lab 10/25/18  0814   WBC 6.6   HGB 13.9   MCV 94      INR 0.93      POTASSIUM 3.8   CHLORIDE 107   CO2 27   BUN 23   CR 0.77   ANIONGAP 7   BORA 8.6   *       Imaging:  Recent Results (from the past 24 hour(s))   XR Shoulder Left G/E 3 Views    Narrative    LEFT SHOULDER THREE OR MORE VIEWS, LEFT HUMERUS TWO OR MORE VIEWS   10/25/2018 9:26 AM     HISTORY: Trauma.     COMPARISON: None.      Impression    IMPRESSION: There is a moderately displaced fracture of the humeral  neck.    STEPHANIE NIETO MD   Humerus XR,  G/E 2 views, left    Narrative    LEFT SHOULDER THREE OR MORE VIEWS, LEFT HUMERUS TWO OR MORE VIEWS   10/25/2018 9:26 AM     HISTORY: Trauma.     COMPARISON: None.      Impression    IMPRESSION: There is a moderately displaced fracture of the humeral  neck.    STEPHANIE NIETO MD   XR Pelvis and Hip Left 2 Views    Narrative    XR PELVIS AND HIP LEFT 2 VIEWS  10/25/2018 9:27 AM    HISTORY:  Trauma.    COMPARISON:  9/19/2018.      Impression    IMPRESSION:  Bilateral hip arthroplasties without evidence for  complication. Minimally displaced fractures of the left superior and  inferior pubic rami, new since the prior exam.    SIA SHI MD   CT Shoulder Left w/o Contrast    Narrative    CT SHOULDER LEFT WITHOUT CONTRAST 10/25/2018 8:03 PM     HISTORY: Evaluate fracture.     TECHNIQUE: Axial scans were performed through the left shoulder with  sagittal and coronal reconstruction. Radiation dose for this scan was  reduced using automated exposure  control, adjustment of the mA and/or  kV according to patient size, or iterative reconstruction technique.    COMPARISON: X-ray from 10/25/2018    FINDINGS: Obliquely oriented fracture of the surgical neck of the  humerus with moderate impaction and mild lateral angulation at the  fracture site. The fracture extends into the greater tuberosity. No  intra-articular component. No loose bodies. There is baseline moderate  glenohumeral degenerative changes. No fracture involving the glenoid.  No clavicle fracture.      Impression    IMPRESSION: Impacted, mildly laterally angulated fracture of the  surgical neck of the humerus that does extend into the greater  tuberosity.    RAFAEL GANNON MD

## 2018-10-26 NOTE — PROGRESS NOTES
Discharge Planner   Discharge Plans in progress: Pt has been offered a bed at [facility] for this weekend   Barriers to discharge plan: None. Pt is not able to obtain bed at [facility]  Follow up plan: [facility] would like clarification on Primary MD.        Entered by: Corinne C. White 10/26/2018 4:07 PM

## 2018-10-26 NOTE — PLAN OF CARE
Problem: Patient Care Overview  Goal: Plan of Care/Patient Progress Review  PT: PT eval completed. Pt is status post L humeral fracture and L pubic fracture. Pt noted B previous hip surgeries with need for reduction in past due to dislocation. Pt lives alone.  Discharge Planner PT   Patient plan for discharge: Agreeable to TCU- has been at [facility] in the past  Current status: Pt needing mod/max A x 2 for bed mob, limited ambulation to 10 feet with poor foot clearance on the L.   Barriers to return to prior living situation: lives alone  Recommendations for discharge: TCU  Rationale for recommendations: Pt needing A x 2 for mobility items and would benefit from continued skilled therapy to progress mobility skills.        Entered by: Georgie Darnell 10/26/2018 12:47 PM

## 2018-10-26 NOTE — PROGRESS NOTES
10/26/18 1006   Quick Adds   Type of Visit Initial PT Evaluation   Living Environment   Lives With alone   Home Accessibility (ramp entry, 3 story home)   Number of Stairs to Enter Home 0  (ramp entry)   Number of Stairs Within Home 12   Stair Railings at Home inside, present on left side   Living Environment Comment walk in shower is on lower level   Self-Care   Usual Activity Tolerance excellent   Current Activity Tolerance poor   Regular Exercise no   Equipment Currently Used at Home none   Functional Level Prior   Ambulation 0-->independent   Transferring 0-->independent   Toileting 0-->independent   Bathing 0-->independent   Dressing 0-->independent   Eating 0-->independent   Communication 0-->understands/communicates without difficulty   Swallowing 0-->swallows foods/liquids without difficulty   Cognition 0 - no cognition issues reported   Fall history within last six months yes   Number of times patient has fallen within last six months 1   General Information   Onset of Illness/Injury or Date of Surgery - Date 10/24/18   Referring Physician Lou Silva   Patient/Family Goals Statement Pt no stated goals- if TCU would like Thelma had a good stay   Pertinent History of Current Problem (include personal factors and/or comorbidities that impact the POC) Pt is status post fall. Pt now presenting with L communated humeral fracture and L pubic rami fracture   Precautions/Limitations (of note previous THAs)   Weight-Bearing Status - LUE nonweight-bearing   Weight-Bearing Status - LLE weight-bearing as tolerated   Cognitive Status Examination   Orientation orientation to person, place and time   Level of Consciousness alert   Follows Commands and Answers Questions 100% of the time;able to follow multistep instructions   Personal Safety and Judgment intact   Memory intact   Pain Assessment   Patient Currently in Pain Yes, see Vital Sign flowsheet  (Crying out in pain with bed mob)   Integumentary/Edema  "  Integumentary/Edema Comments as expected following humeral fx   Posture    Posture Comments forward flexed   Range of Motion (ROM)   ROM Comment tolerating L hip/knee flexion to 90 degrees   Strength   Strength Comments able to perform SAQ on the L side, limited tolerance to hip flexion kayden with attempts at ambulation   Bed Mobility   Bed Mobility Comments mod A x 2 for supine to sit   Transfer Skills   Transfer Comments min A x 2 with hemiwalker   Gait   Gait Comments min A x 2- limited L foot clearance, poor tolerance- 5 feet   Modality Interventions   Planned Modality Interventions Cryotherapy   General Therapy Interventions   Planned Therapy Interventions balance training;bed mobility training;gait training;strengthening;transfer training;risk factor education;progressive activity/exercise;home program guidelines   Clinical Impression   Criteria for Skilled Therapeutic Intervention yes, treatment indicated   PT Diagnosis decreased functional mobility status post L humeral fracture and L sacral fx   Influenced by the following impairments pain, decreased ROM, decreased strength   Functional limitations due to impairments decreased bed mob, transfers, ambulation   Clinical Presentation Stable/Uncomplicated   Clinical Presentation Rationale improving as expected   Clinical Decision Making (Complexity) Low complexity   Therapy Frequency` daily   Predicted Duration of Therapy Intervention (days/wks) 3 days   Anticipated Discharge Disposition Transitional Care Facility   Risk & Benefits of therapy have been explained Yes   Patient, Family & other staff in agreement with plan of care Yes   [facility] [employer] TM \"6 Clicks\"   2016, Trustees of [facility], under license to [employer].  All rights reserved.   6 Clicks Short Forms Basic Mobility Inpatient Short Form   [facility] [employer]  \"6 Clicks\" V.2 Basic Mobility Inpatient Short Form   1. Turning from your back to your side while in a flat bed " without using bedrails? 2 - A Lot   2. Moving from lying on your back to sitting on the side of a flat bed without using bedrails? 2 - A Lot   3. Moving to and from a bed to a chair (including a wheelchair)? 2 - A Lot   4. Standing up from a chair using your arms (e.g., wheelchair, or bedside chair)? 2 - A Lot   5. To walk in hospital room? 2 - A Lot   6. Climbing 3-5 steps with a railing? 1 - Total   Basic Mobility Raw Score (Score out of 24.Lower scores equate to lower levels of function) 11   Total Evaluation Time   Total Evaluation Time (Minutes) 10

## 2018-10-26 NOTE — PROGRESS NOTES
Care Transitions Team: Following for CC, discharge planning, and disposition.      Per chart review PT has noted that pt lives alone,  is requiring mod/max A x 2 for bed mob, limited ambulation to 10 feet with poor foot clearance on the L and is recommending  TCU/. Met with pt at bedside who is in agreement with this plan nd requests a referral be sent to [facility] as her 1st choice.#2, [facility],  as her second, semi private room.     Referrals placed .      Lou Bhardwaj RN, BSN, Care Transitions Specialist   Care Transitions Team  [phone]

## 2018-10-26 NOTE — PLAN OF CARE
Problem: Patient Care Overview  Goal: Plan of Care/Patient Progress Review  Discharge Planner OT   Patient plan for discharge: TCU  Current status: OT orders received, per chart patient status post fall. Pt now presenting with L communated humeral fracture and L pubic rami fracture. Per PT note, patient is mod/max A x 2 for bed mob, limited ambulation to 10 feet with poor foot clearance on the L. At this time, TCU is recommended and patient is not appropriate for IP OT services, will defer to next level of care and discharge recommendations and L UE exercises for hand, wrist and forearm to PT.   Barriers to return to prior living situation: defer to PT  Recommendations for discharge: defer to PT  Rationale for recommendations: Per PT, patient will need TCU at discharge, therefore will defer OT to next level of care, at this time, will discontinue OT orders and defer further recommendations as well as L UE exercises for hand, wrist and forearm  to PT.       Entered by: Anette Pena 10/26/2018 3:13 PM

## 2018-10-26 NOTE — PROGRESS NOTES
[facility] HEALTH SERVICES  SPIRITUAL ASSESSMENT Progress Note  [facility]    Visited pt per consult order requesting assistance in considering code status and ACP.  Xiao expressed to other staff that she had no interest in a conversation with a  but, on introducing the topic of ACP concerns, she was readily receptive.    She said that her goal is to leave her only son with no regrets should difficult decisions need to be made and, accordingly, she wants her son to make all significant decisions about her treatment, whether she is able to make them herself or not.    While acknowledging that she would still need to express that wish while she is able to do so, introduced the possibility of capturing her wishes about her health care agent should she be unable to speak for herself through the use of the short form of a HCD.  She received documents offered and said she would like to review them with her son tonight, reviewing the document together tomorrow.    Will visit this pt on 10/27.      Robinson Bolden M.Div.  Staff

## 2018-10-26 NOTE — CONSULTS
"[facility]  Pain Service Consultation   Text Page    Date of Admission:  10/25/2018    Assessment & Plan   Malou Mccollum is a 72 year old female who was admitted on 10/25/2018. I was asked by Dr. Mora to see the patient for pain management.    1)  Acute right arm pain and left groin pain after fall with new left displaced humeral neck fracture and left superior and inferior pubic rami fractures.    2)  Patient with chronic low back and B knee pain, on chronic opioid therapy managed by [facility] pain clinic  Baseline 22.5mg Daily Morphine Equivalent as dispensed and as reported daily use.  Patient has no expected low opioid tolerance.     Patient's opioid use in past 24 hours: 42.5mg = 63.75mg Daily Morphine Equivalent    3)  Risk factors for opioid related harms  -Renal insufficiency  - Age > 65 years old    4)  Opioid induced side-effects:  -Constipation - denies  -Nausea/Vomit-denies  -Sedation-denies  -Urinary Retention-denies    5)  Other/Related:    -Depression/anxiety/Bipolar  -Deconditioning - Spends most days in bed.    PLAN:   1)  Will schedule tylenol and allow for greater daily dose of oxycodone.    2)  Toradol helpful but with CKD and GERD will trial topical NSAID as below.   3)Non-opioid multimodal medication therapy  -Topical:Voltaren 1% Topical Gel QID to L arm  -N-SAIDS:Avoid systemic if possible due to CKD and GERD  -Muscle Relaxants:Tizanidine as PTA  -Adjuvants:Acetaminophen 1000mg TID, Gabapentin as PTA, Hydroxyzine PRN pan \"flare\"  -Antidepresants/anxiolytics:As PTA, notes menta health is stable and close connection with her psychiatrist.  4)  Non-medication interventions  Positioning, Relaxation  5)  Opioids:Oxycodone 10mg Q 4 hours PRN  Opioids Treatment Goal: -Improvement in function  -Participate in PT  -Management of acute pain, goal to return to baseline use.   6)  Constipation Prophylaxis   Continue to Monitor, Bowel Meds PRN per Constipation Order Set  7)  Pain " Education  -Opioid safe use, storage and disposal information included in DC AVS  8)  DC Planning   Discussed goal of Opioid therapy as above with patient and nursing  If taper does not happen naturally as pain improves, scheduled taper may be beneficial at TCU.  Continued outpatient management of pain per MAPS  Disposition:TCU  Support systems: reduced, family not all local.     Time Spent on this Encounter   I spent  55 minutes total, > 50% in assessment of the patient, counseling and discussion with the patient and family as documented in this note and coordination of care and discussion with the health care team.    Anette Winn APRN, CNS, CNP  Pain Management and Palliative Care  [facility]  Pgr: [phone]      Reason for Consult   Reason for consult: I was asked by Dr. Mora to evaluate this patient for pain management.    Primary Care Physician   Primary Care Physician:Addis Adams  Pain Specialist: [facility]    Chief Complaint   Left arm and right groin pain    History is obtained from the patient and electronic health record    History of Present Illness   Malou Mccollum is a 72 year old female who presents with right arm pain and left groin pain after fall with new left displaced humeral neck fracture and left superior and inferior pubic rami fractures.  She will discontinue to TCU and may weight bear on LE's with a sling on Left UE.     CURRENT PAIN:  Her pain is located in the left arm and right groin  It is described as  shooting pains with movement  Currently it is rated as 8/10  It improves by rest and oxycodone  It worsens by movement  She has been compliant with the recommendations while in the hospital.      PAIN HISTORY:  The pain is mainly located in the low back and knees  It is described as dull ache and restrictive of activity  It improves by Percocet, rest, and warming up in AM.  It worsens by most activity.  She has been compliant with the recommendations while in the  [facility].      [facility] database review: Percocet 7.5/325 #90/month, Gabapentin 300mg #1080 / 90 days      Past Medical History   I have reviewed this patient's medical history and updated it with pertinent information if needed.   Past Medical History:   Diagnosis Date     Herniated disc, cervical    OA  DMII  Gastric Bypass  PN  ISABEL  Bipolar Disorder  HLD  Back Pain    Past Surgical History   I have reviewed this patient's surgical history and updated it with pertinent information if needed.  Past Surgical History:   Procedure Laterality Date     ORTHOPEDIC SURGERY           Prior to Admission Medications   Prior to Admission Medications   Prescriptions Last Dose Informant Patient Reported? Taking?   OXcarbazepine (TRILEPTAL) 300 MG tablet 10/24/2018 at Unknown time  Yes Yes   Sig: Take 600 mg by mouth every morning   OXcarbazepine (TRILEPTAL) 300 MG tablet 10/24/2018 at Unknown time  Yes Yes   Sig: Take 300 mg by mouth every evening   Vitamin D, Cholecalciferol, 1000 units TABS 10/24/2018 at Unknown time  Yes Yes   Sig: Take 3,000 Units by mouth daily   atorvastatin (LIPITOR) 40 MG tablet 10/24/2018 at Unknown time  Yes Yes   Sig: Take 40 mg by mouth daily   cyanocobalamin (VITAMIN B12) 1000 MCG/ML injection 10/22/2018  Yes Yes   Sig: Inject 1 mL into the muscle every 21 days   ferrous gluconate (FERGON) 324 (38 Fe) MG tablet 10/24/2018 at Unknown time  Yes Yes   Sig: Take 324 mg by mouth daily (with breakfast)   fexofenadine (ALLEGRA) 180 MG tablet 10/24/2018 at Unknown time  Yes Yes   Sig: Take 180 mg by mouth daily   gabapentin (NEURONTIN) 300 MG capsule 10/24/2018 at pm  Yes Yes   Sig: Take 1,200 mg by mouth 3 times daily   omeprazole (PRILOSEC) 20 MG CR capsule 10/24/2018 at Unknown time  Yes Yes   Sig: Take 40 mg by mouth daily   oxyCODONE-acetaminophen (PERCOCET) 7.5-325 MG per tablet 10/24/2018 at Unknown time  Yes Yes   Sig: Take 1 tablet by mouth 3 times daily   senna (SENOKOT) 8.6 MG tablet 10/24/2018 at  Unknown time  Yes Yes   Sig: Take 1 tablet by mouth daily   sertraline (ZOLOFT) 100 MG tablet 10/24/2018 at Unknown time  Yes Yes   Sig: Take 150 mg by mouth daily   tiZANidine (ZANAFLEX) 2 MG tablet Past Week at Unknown time  Yes Yes   Sig: Take 2 mg by mouth 3 times daily as needed for muscle spasms      Facility-Administered Medications: None     Allergies   Allergies   Allergen Reactions     Aleve [Naproxen]      Penicillin G      Tape [Adhesive Tape]        Social History   I have reviewed this patient's social history and updated it with pertinent information if needed. Malou Mccollum  reports that she has been smoking.  She has been smoking about 1.50 packs per day. She has never used smokeless tobacco. She reports that she does not drink alcohol or use illicit drugs.    Family History   I have reviewed this patient's family history and updated it with pertinent information if needed.   No family history on file.    Review of Systems   The 10 point Review of Systems is negative other than noted in the HPI or here.    Denies Bowel or bladder dysfunction    Physical Exam   Temp:  [96.4  F (35.8  C)-99.8  F (37.7  C)] 96.4  F (35.8  C)  Pulse:  [61-99] 99  Heart Rate:  [61] 61  Resp:  [14-20] 20  BP: (113-165)/(55-68) 113/55  SpO2:  [88 %-96 %] 93 %  197 lbs 0 oz  GEN:  Alert, oriented x 3, appears comfortable, No apparent distress.  HEENT:  Normocephalic/atraumatic, no scleral icterus, no nasal discharge, mouth moist.  CV:  RRR, S1, S2; no murmurs or other irregularities noted.  +3 DP/PT pulses bilaterally; no edema bilateral lower extremeties.  RESP:  Clear to auscultation bilaterally withsome wheezing in R lung base, cleared with cough.   Symmetric chest rise on inhalation noted.  Normal respiratory effort.  ABD:Obese,   Rounded, soft, non-tender/non-distended.  +BS  EXT:  Edema & pulses as noted above.  Color, moisture and sensation intact x 4.     M/S:   L UE in immobilizer.  SKIN:  Dry to touch, no  exanthems noted in the visualized areas.    NEURO: Symmetric strength +5/5.  Sensation to touch intact all extremities.   There is no area of allodynia or hyperesthesia.  PAIN BEHAVIOR: Cooperative  Psych:  Normal affect.  Calm, cooperative, conversant appropriately.       Data   Results for orders placed or performed during the hospital encounter of 10/25/18 (from the past 24 hour(s))   CT Shoulder Left w/o Contrast    Narrative    CT SHOULDER LEFT WITHOUT CONTRAST 10/25/2018 8:03 PM     HISTORY: Evaluate fracture.     TECHNIQUE: Axial scans were performed through the left shoulder with  sagittal and coronal reconstruction. Radiation dose for this scan was  reduced using automated exposure control, adjustment of the mA and/or  kV according to patient size, or iterative reconstruction technique.    COMPARISON: X-ray from 10/25/2018    FINDINGS: Obliquely oriented fracture of the surgical neck of the  humerus with moderate impaction and mild lateral angulation at the  fracture site. The fracture extends into the greater tuberosity. No  intra-articular component. No loose bodies. There is baseline moderate  glenohumeral degenerative changes. No fracture involving the glenoid.  No clavicle fracture.      Impression    IMPRESSION: Impacted, mildly laterally angulated fracture of the  surgical neck of the humerus that does extend into the greater  tuberosity.    RAFAEL GANNON MD   Magnesium   Result Value Ref Range    Magnesium 1.8 1.6 - 2.3 mg/dL

## 2018-10-26 NOTE — PLAN OF CARE
Problem: Fracture Orthopaedic (Adult)  Goal: Signs and Symptoms of Listed Potential Problems Will be Absent, Minimized or Managed (Fracture Orthopaedic)  Signs and symptoms of listed potential problems will be absent, minimized or managed by discharge/transition of care (reference Fracture Orthopaedic (Adult) CPG).   Outcome: No Change  PM  SHIFT  Pt lethargic but arouseable most of the shift.pt also a little confused when first woke. Pt thought it was Friday already.  Pt states she sleeps during the day is awake most of the night usually. Pt taking scheduled percocet for pain. Pt able to sleep and drifts back to sleep during cares. Pt states her pain is a 8 to 10 in her left arm when she moves it. Pt desats when asleep and on room air. pt noted to snore when asleep. Oxygen attempted to wean but not able to tolerate. sats in the 80s on room air. Pulse oximeter on. LS RML was noted to have exp wheezes. Rest of LS clear but dims. Pt is a smoker. IS given to pt. Pt declined any nicotine withdrawal medications. Pt up to the BSC with assist of 2 to void and than went down to CT for her left shoulder. Pt has sling on left arm. Pt yet to be seen by ortho and PT. Plan is home vs rehab on discharge.

## 2018-10-26 NOTE — CONSULTS
[facility] Orthopedic Consultation    Malou Mccollum MRN# [id number]   Age: 72 year old YOB: 1945     Date of Admission:  10/25/2018    Reason for consult: Left humerus fracture       Requesting physician: Lou TERRY       Level of consult: One-time consult to assist in determining a diagnosis and to recommend an appropriate treatment plan           Assessment and Plan:   Assessment:   Left proximal humerus fracture  Left inferior and superior pubic rami fractures      Plan:   Non-operative treatment for both fractures  May WBAT bilateral LE  Non-WB through left UE  Remove immobilizer and place in sling  Codman's and pendulums ok once pain improved  Follow-up with fracture/shoulder provider in 10-14 days, patient previously seen at [facility]  Will add toradol for pain control           Chief Complaint:   Left humerus and pubic rami fractures         History of Present Illness:   This patient is a 72 year old female who presents with the following condition requiring a hospital admission:    Mrs. Mccollum states that yesterday morning she was getting out of bed when she lost her balance and fell onto her left side. She was unable to stand but she did crawl to the phone to call 911. She was brought to [facility] ED for further evaluation and treatment planning. Patient has had previous bilateral total hip arthroplasties as well as ORIF of her distal femur due to trauma/fracture. She has had her surgeries at [facility]. She has recently been seen at [facility] for shoulder issues which it was suggested that she undergo a left total shoulder arthroplasty.   She is not currently anticoagulated.           Past Medical History:     Past Medical History:   Diagnosis Date     Herniated disc, cervical              Past Surgical History:     Past Surgical History:   Procedure Laterality Date     ORTHOPEDIC SURGERY               Social History:     Social History   Substance Use Topics     Smoking status:  Current Every Day Smoker     Packs/day: 1.50     Smokeless tobacco: Never Used     Alcohol use No             Family History:   No family history on file.          Immunizations:     VACCINE/DOSE   Diptheria   DPT   DTAP   HBIG   Hepatitis A   Hepatitis B   HIB   Influenza   Measles   Meningococcal   MMR   Mumps   Pneumococcal   Polio   Rubella   Small Pox   TDAP   Varicella   Zoster             Allergies:     Allergies   Allergen Reactions     Aleve [Naproxen]      Penicillin G      Tape [Adhesive Tape]              Medications:     Current Facility-Administered Medications   Medication     acetaminophen (TYLENOL) tablet 650 mg     atorvastatin (LIPITOR) tablet 40 mg     cholecalciferol (vitamin D3) tablet 3,000 Units     diphenhydrAMINE (BENADRYL) capsule 25 mg    Or     diphenhydrAMINE (BENADRYL) injection 25 mg     ferrous gluconate (FERGON) tablet 324 mg     fexofenadine (ALLEGRA) tablet 180 mg     gabapentin (NEURONTIN) capsule 1,200 mg     HYDROmorphone (PF) (DILAUDID) injection 0.3-0.5 mg     influenza Vac Split High-Dose (FLUZONE) injection 0.5 mL     lidocaine (LMX4) cream     lidocaine 1 % 1 mL     magnesium sulfate 4 g in 100 mL sterile water (premade)     melatonin tablet 1 mg     naloxone (NARCAN) injection 0.1-0.4 mg     omeprazole (priLOSEC) CR capsule 40 mg     ondansetron (ZOFRAN-ODT) ODT tab 4 mg    Or     ondansetron (ZOFRAN) injection 4 mg     OXcarbazepine (TRILEPTAL) tablet 300 mg     OXcarbazepine (TRILEPTAL) tablet 600 mg     oxyCODONE IR (ROXICODONE) tablet 5 mg     oxyCODONE-acetaminophen (PERCOCET) 7.5-325 MG per tablet 1 tablet     polyethylene glycol (MIRALAX/GLYCOLAX) Packet 17 g     potassium chloride (KLOR-CON) Packet 20-40 mEq     potassium chloride 10 mEq in 100 mL intermittent infusion with 10 mg lidocaine     potassium chloride 10 mEq in 100 mL sterile water intermittent infusion (premix)     potassium chloride 20 mEq in 50 mL intermittent infusion     potassium chloride SA  "(K-DUR/KLOR-CON M) CR tablet 20-40 mEq     senna-docusate (SENOKOT-S;PERICOLACE) 8.6-50 MG per tablet 1 tablet    Or     senna-docusate (SENOKOT-S;PERICOLACE) 8.6-50 MG per tablet 2 tablet     sennosides (SENOKOT) tablet 1 tablet     sertraline (ZOLOFT) tablet 150 mg     sodium chloride (PF) 0.9% PF flush 3 mL     sodium chloride (PF) 0.9% PF flush 3 mL     tiZANidine (ZANAFLEX) tablet 2 mg             Review of Systems:   CV: NEGATIVE for chest pain, palpitations or peripheral edema  C: NEGATIVE for fever, chills, change in weight  E/M: NEGATIVE for ear, mouth and throat problems  R: NEGATIVE for significant cough or SOB          Physical Exam:   All vitals have been reviewed  Patient Vitals for the past 24 hrs:   BP Temp Temp src Pulse Resp SpO2 Height Weight   10/26/18 0817 152/64 99.8  F (37.7  C) Oral 69 16 92 % - -   10/26/18 0438 147/58 98.1  F (36.7  C) Oral 71 16 - - -   10/26/18 0044 - - - - 16 - - -   10/25/18 2345 165/68 99  F (37.2  C) Oral 61 16 96 % - -   10/25/18 1915 127/61 97.5  F (36.4  C) Oral 66 16 95 % - -   10/25/18 1716 - - - - 14 91 % - -   10/25/18 1715 - - - - - (!) 89 % - -   10/25/18 1714 - - - - - (!) 88 % - -   10/25/18 1617 148/70 97.2  F (36.2  C) Oral 68 16 95 % - -   10/25/18 1313 144/71 98.9  F (37.2  C) Oral 74 16 94 % 1.575 m (5' 2\") 89.4 kg (197 lb)   10/25/18 1249 - - - - - 93 % - -   10/25/18 1242 - - - - - 92 % - -   10/25/18 1230 143/81 - - - - - - -   10/25/18 1000 - - - - - 93 % - -   10/25/18 0945 - - - - - 96 % - -   10/25/18 0937 109/66 - - - - 93 % - -       Intake/Output Summary (Last 24 hours) at 10/26/18 0905  Last data filed at 10/25/18 2200   Gross per 24 hour   Intake              883 ml   Output                0 ml   Net              883 ml     Patient laying comfortably in bed   No ecchymosis or erythema over entirety of the left leg  No notable swelling or edema  Negative log roll  Hip flexes to 90degrees  Internal rotation 30 degrees, External rotation 15 " degrees  No pain with internal/external rotation while in flexion  Bilateral calves are soft, non-tender.  Bilateral lower extremity is NVI.  Sensation intact bilateral lower extremities  Active dorsi and plantar flexion bilaterally  +Dp pulse    Shoulder immobilizer in place  Sensation intact in upper arm over biceps as well as in hand r/m/u nerve distribution  Able to abduct and oppose left thumb  +Radial pulse  +cap refill             Data:   All laboratory data reviewed  Results for orders placed or performed during the hospital encounter of 10/25/18   XR Pelvis and Hip Left 2 Views    Narrative    XR PELVIS AND HIP LEFT 2 VIEWS  10/25/2018 9:27 AM    HISTORY:  Trauma.    COMPARISON:  9/19/2018.      Impression    IMPRESSION:  Bilateral hip arthroplasties without evidence for  complication. Minimally displaced fractures of the left superior and  inferior pubic rami, new since the prior exam.    SIA SHI MD   Humerus XR,  G/E 2 views, left    Narrative    LEFT SHOULDER THREE OR MORE VIEWS, LEFT HUMERUS TWO OR MORE VIEWS   10/25/2018 9:26 AM     HISTORY: Trauma.     COMPARISON: None.      Impression    IMPRESSION: There is a moderately displaced fracture of the humeral  neck.    STEPHANIE NIETO MD   XR Shoulder Left G/E 3 Views    Narrative    LEFT SHOULDER THREE OR MORE VIEWS, LEFT HUMERUS TWO OR MORE VIEWS   10/25/2018 9:26 AM     HISTORY: Trauma.     COMPARISON: None.      Impression    IMPRESSION: There is a moderately displaced fracture of the humeral  neck.    STEPHANIE NIETO MD   CT Shoulder Left w/o Contrast    Narrative    CT SHOULDER LEFT WITHOUT CONTRAST 10/25/2018 8:03 PM     HISTORY: Evaluate fracture.     TECHNIQUE: Axial scans were performed through the left shoulder with  sagittal and coronal reconstruction. Radiation dose for this scan was  reduced using automated exposure control, adjustment of the mA and/or  kV according to patient size, or iterative reconstruction technique.    COMPARISON:  X-ray from 10/25/2018    FINDINGS: Obliquely oriented fracture of the surgical neck of the  humerus with moderate impaction and mild lateral angulation at the  fracture site. The fracture extends into the greater tuberosity. No  intra-articular component. No loose bodies. There is baseline moderate  glenohumeral degenerative changes. No fracture involving the glenoid.  No clavicle fracture.      Impression    IMPRESSION: Impacted, mildly laterally angulated fracture of the  surgical neck of the humerus that does extend into the greater  tuberosity.    RAFAEL GANNON MD   CBC with platelets differential   Result Value Ref Range    WBC 6.6 4.0 - 11.0 10e9/L    RBC Count 4.46 3.8 - 5.2 10e12/L    Hemoglobin 13.9 11.7 - 15.7 g/dL    Hematocrit 42.1 35.0 - 47.0 %    MCV 94 78 - 100 fl    MCH 31.2 26.5 - 33.0 pg    MCHC 33.0 31.5 - 36.5 g/dL    RDW 13.3 10.0 - 15.0 %    Platelet Count 178 150 - 450 10e9/L    Diff Method Automated Method     % Neutrophils 72.3 %    % Lymphocytes 17.7 %    % Monocytes 7.3 %    % Eosinophils 1.4 %    % Basophils 0.5 %    % Immature Granulocytes 0.8 %    Nucleated RBCs 0 0 /100    Absolute Neutrophil 4.7 1.6 - 8.3 10e9/L    Absolute Lymphocytes 1.2 0.8 - 5.3 10e9/L    Absolute Monocytes 0.5 0.0 - 1.3 10e9/L    Absolute Eosinophils 0.1 0.0 - 0.7 10e9/L    Absolute Basophils 0.0 0.0 - 0.2 10e9/L    Abs Immature Granulocytes 0.1 0 - 0.4 10e9/L    Absolute Nucleated RBC 0.0    Basic metabolic panel   Result Value Ref Range    Sodium 141 133 - 144 mmol/L    Potassium 3.8 3.4 - 5.3 mmol/L    Chloride 107 94 - 109 mmol/L    Carbon Dioxide 27 20 - 32 mmol/L    Anion Gap 7 3 - 14 mmol/L    Glucose 139 (H) 70 - 99 mg/dL    Urea Nitrogen 23 7 - 30 mg/dL    Creatinine 0.77 0.52 - 1.04 mg/dL    GFR Estimate 73 >60 mL/min/1.7m2    GFR Estimate If Black 89 >60 mL/min/1.7m2    Calcium 8.6 8.5 - 10.1 mg/dL   INR   Result Value Ref Range    INR 0.93 0.86 - 1.14   Magnesium   Result Value Ref Range    Magnesium  1.8 1.6 - 2.3 mg/dL   Social Work IP Consult    Narrative    Lou Bhardwaj, RN     10/25/2018  2:40 PM  Care Transitions Team: Following for CC, discharge planning, and   disposition.        Per chart review MD has consulted SWS for noted concerns of POLST   and POA, spoke with PA , Lou Silva and she explained that a   POLST could be done outpt. at PCP, unless Pain and Palliative end   up involved with this pt.   But would like POA information, which is NOT related to medical   decision making, a POA is a legal document granting authority for   financial and property matters.     Per chart review and conversation with PA,   A Health Care   directive/Agent  is the intention not POA.   Spiritual Health Consult placed for this reason.       Lou Bhardwaj RN, BSN, Care Transitions Specialist   Care Transitions Team  [phone]               Attestation:  I have reviewed today's vital signs, notes, medications, labs and imaging with Dr. Mejias.  Amount of time performed on this consult: 20 minutes.    Lou Cifuentes PA-C

## 2018-10-26 NOTE — PLAN OF CARE
Problem: Patient Care Overview  Goal: Plan of Care/Patient Progress Review  Outcome: Improving  Pt is alert, VSS, On 2L of O2 over night 93-96%. Exp wheezes noted on LS. Pain managed with PRN Oxy, schedule tylenol and percoset Continues to encourage CDB/IS while awake. Pt on regular diet, up to the BSC with assist of 2 . NWB to ULE, and weight bearing as tolerated to LE.Pt has sling on left arm. Pending rtho/ PT consult. Plan is to dc home/rehab at discharge.

## 2018-10-27 ENCOUNTER — APPOINTMENT (OUTPATIENT)
Dept: PHYSICAL THERAPY | Facility: CLINIC | Age: 73
DRG: 563 | End: 2018-10-27
Payer: MEDICARE

## 2018-10-27 PROCEDURE — 25000132 ZZH RX MED GY IP 250 OP 250 PS 637: Mod: GY | Performed by: PHYSICIAN ASSISTANT

## 2018-10-27 PROCEDURE — A9270 NON-COVERED ITEM OR SERVICE: HCPCS | Mod: GY | Performed by: INTERNAL MEDICINE

## 2018-10-27 PROCEDURE — 40000193 ZZH STATISTIC PT WARD VISIT: Performed by: PHYSICAL THERAPY ASSISTANT

## 2018-10-27 PROCEDURE — 97116 GAIT TRAINING THERAPY: CPT | Mod: GP | Performed by: PHYSICAL THERAPY ASSISTANT

## 2018-10-27 PROCEDURE — 25000132 ZZH RX MED GY IP 250 OP 250 PS 637: Mod: GY | Performed by: NURSE PRACTITIONER

## 2018-10-27 PROCEDURE — 12000000 ZZH R&B MED SURG/OB

## 2018-10-27 PROCEDURE — 99239 HOSP IP/OBS DSCHRG MGMT >30: CPT | Performed by: INTERNAL MEDICINE

## 2018-10-27 PROCEDURE — 97530 THERAPEUTIC ACTIVITIES: CPT | Mod: GP | Performed by: PHYSICAL THERAPY ASSISTANT

## 2018-10-27 PROCEDURE — A9270 NON-COVERED ITEM OR SERVICE: HCPCS | Mod: GY | Performed by: PHYSICIAN ASSISTANT

## 2018-10-27 PROCEDURE — 25000128 H RX IP 250 OP 636: Performed by: INTERNAL MEDICINE

## 2018-10-27 PROCEDURE — 25000132 ZZH RX MED GY IP 250 OP 250 PS 637: Mod: GY | Performed by: INTERNAL MEDICINE

## 2018-10-27 PROCEDURE — A9270 NON-COVERED ITEM OR SERVICE: HCPCS | Mod: GY | Performed by: NURSE PRACTITIONER

## 2018-10-27 PROCEDURE — 25000128 H RX IP 250 OP 636: Performed by: PHYSICIAN ASSISTANT

## 2018-10-27 RX ORDER — ACETAMINOPHEN 500 MG
500 TABLET ORAL EVERY 8 HOURS
DISCHARGE
Start: 2018-10-27

## 2018-10-27 RX ORDER — HYDROXYZINE HYDROCHLORIDE 25 MG/1
25 TABLET, FILM COATED ORAL 3 TIMES DAILY PRN
Qty: 120 TABLET | DISCHARGE
Start: 2018-10-27

## 2018-10-27 RX ORDER — OXYCODONE HYDROCHLORIDE 5 MG/1
10 TABLET ORAL EVERY 4 HOURS PRN
Qty: 40 TABLET | Refills: 0 | Status: SHIPPED | OUTPATIENT
Start: 2018-10-27

## 2018-10-27 RX ORDER — OXYCODONE AND ACETAMINOPHEN 7.5; 325 MG/1; MG/1
1 TABLET ORAL 3 TIMES DAILY
Qty: 63 TABLET | Refills: 0 | Status: SHIPPED | OUTPATIENT
Start: 2018-10-27 | End: 2018-11-17

## 2018-10-27 RX ORDER — POLYETHYLENE GLYCOL 3350 17 G/17G
17 POWDER, FOR SOLUTION ORAL DAILY PRN
Qty: 7 PACKET | DISCHARGE
Start: 2018-10-27

## 2018-10-27 RX ADMIN — FEXOFENADINE HYDROCHLORIDE 180 MG: 180 TABLET, FILM COATED ORAL at 08:49

## 2018-10-27 RX ADMIN — DICLOFENAC SODIUM 2 G: 10 GEL TOPICAL at 19:29

## 2018-10-27 RX ADMIN — ACETAMINOPHEN 1000 MG: 500 TABLET, FILM COATED ORAL at 07:07

## 2018-10-27 RX ADMIN — Medication 0.5 MG: at 02:29

## 2018-10-27 RX ADMIN — OXYCODONE HYDROCHLORIDE 10 MG: 5 TABLET ORAL at 15:42

## 2018-10-27 RX ADMIN — HYDROXYZINE HYDROCHLORIDE 25 MG: 25 TABLET ORAL at 13:17

## 2018-10-27 RX ADMIN — Medication 0.5 MG: at 13:17

## 2018-10-27 RX ADMIN — SENNOSIDES 1 TABLET: 8.6 TABLET, FILM COATED ORAL at 08:51

## 2018-10-27 RX ADMIN — GABAPENTIN 1200 MG: 400 CAPSULE ORAL at 13:17

## 2018-10-27 RX ADMIN — OXCARBAZEPINE 300 MG: 300 TABLET ORAL at 19:29

## 2018-10-27 RX ADMIN — VITAMIN D, TAB 1000IU (100/BT) 3000 UNITS: 25 TAB at 08:50

## 2018-10-27 RX ADMIN — OXCARBAZEPINE 600 MG: 300 TABLET ORAL at 08:49

## 2018-10-27 RX ADMIN — HYDROXYZINE HYDROCHLORIDE 25 MG: 25 TABLET ORAL at 21:19

## 2018-10-27 RX ADMIN — Medication 0.5 MG: at 04:35

## 2018-10-27 RX ADMIN — DICLOFENAC SODIUM 2 G: 10 GEL TOPICAL at 08:54

## 2018-10-27 RX ADMIN — ACETAMINOPHEN 1000 MG: 500 TABLET, FILM COATED ORAL at 21:00

## 2018-10-27 RX ADMIN — OXYCODONE HYDROCHLORIDE 10 MG: 5 TABLET ORAL at 08:50

## 2018-10-27 RX ADMIN — KETOROLAC TROMETHAMINE 15 MG: 15 INJECTION, SOLUTION INTRAMUSCULAR; INTRAVENOUS at 01:06

## 2018-10-27 RX ADMIN — OMEPRAZOLE 40 MG: 20 CAPSULE, DELAYED RELEASE ORAL at 08:49

## 2018-10-27 RX ADMIN — FERROUS GLUCONATE 324 MG: 324 TABLET ORAL at 08:49

## 2018-10-27 RX ADMIN — SERTRALINE HYDROCHLORIDE 150 MG: 100 TABLET ORAL at 08:49

## 2018-10-27 RX ADMIN — DICLOFENAC SODIUM 2 G: 10 GEL TOPICAL at 13:20

## 2018-10-27 RX ADMIN — DICLOFENAC SODIUM 2 G: 10 GEL TOPICAL at 15:43

## 2018-10-27 RX ADMIN — OXYCODONE HYDROCHLORIDE 10 MG: 5 TABLET ORAL at 19:28

## 2018-10-27 RX ADMIN — TIZANIDINE 2 MG: 2 TABLET ORAL at 08:49

## 2018-10-27 RX ADMIN — Medication 0.5 MG: at 07:07

## 2018-10-27 RX ADMIN — SENNOSIDES 1 TABLET: 8.6 TABLET, FILM COATED ORAL at 19:29

## 2018-10-27 RX ADMIN — ACETAMINOPHEN 1000 MG: 500 TABLET, FILM COATED ORAL at 13:19

## 2018-10-27 RX ADMIN — GABAPENTIN 1200 MG: 400 CAPSULE ORAL at 08:50

## 2018-10-27 RX ADMIN — OXYCODONE HYDROCHLORIDE 10 MG: 5 TABLET ORAL at 01:53

## 2018-10-27 RX ADMIN — GABAPENTIN 1200 MG: 400 CAPSULE ORAL at 19:28

## 2018-10-27 ASSESSMENT — ACTIVITIES OF DAILY LIVING (ADL)
ADLS_ACUITY_SCORE: 12
ADLS_ACUITY_SCORE: 13
ADLS_ACUITY_SCORE: 13
ADLS_ACUITY_SCORE: 12
ADLS_ACUITY_SCORE: 13
ADLS_ACUITY_SCORE: 12

## 2018-10-27 NOTE — PROGRESS NOTES
Discharge Planner     Discharge Plans in progress: Pt has been offered a bed at [facility] for this weekend. Shared room for Sun 10/28.    Barriers to discharge plan: None identified.     Follow up plan: Updated [facility] on MD info as requested as well as discharge time. Spoke with son Tony who will transport patient at 1300 on 10/28 to [facility]. SW will continue to follow should any discharge needs arise.

## 2018-10-27 NOTE — PROGRESS NOTES
SPIRITUAL HEALTH SERVICES  SPIRITUAL ASSESSMENT Progress Note  [facility]    Visited pt as follow up on ACP questions.  Xiao said that her son had the HCD form and was reading it over.  She anticipated transfer today to [facility] and said that her son usually works on Saturdays.  We discussed options for completing and submitting the form once she has left.    Per anticipated discharge, no further visits are planned but are available on request.    Robinson Bolden M.Div.  Staff   Pager [phone]

## 2018-10-27 NOTE — DISCHARGE SUMMARY
[facility]    Discharge Summary  Hospitalist    Date of Admission:  10/25/2018  Date of Discharge:  10/27/2018  Discharging Provider: Neo Mora MD  Date of Service (when I saw the patient): 10/27/18    Discharge Diagnoses   Left humeral neck fracture, due to fall  Left superior and inferior pubic rami fractures, due to fall  Fall  Chronic back and knee pain  There is numbness type II with peripheral neuropathy, diet controlled  Hyperlipidemia  Tobacco abuse/dependence  Bipolar disorder    History of Present Illness  &Hospital Course   Malou Mccollum is an 72 year old female who presented with a fall.  Patient states she lost her balance when getting up to go to the bathroom.  She fell on her left side and after evaluation was found to have a left humeral neck fracture as well as left superior and inferior pubic rami fractures.  Patient's left arm was placed in immobilizer and she was admitted for IV fluids and pain control.  Patient was seen in consultation by orthopedic surgery and surgical intervention was not required.  They do recommend follow-up in 10-14 days with orthopedic surgery.  Patient was seen by therapies and will have weightbearing as tolerated and will keep left arm in sling at all times except for bathing.  Patient has chronic back and knee pain and is on chronic opioids.  She was seen in consultation by the pain service and she will have some oxycodone for breakthrough pain, Atarax p.r.n., diclofenac gel.      Neo Mora MD    Significant Results and Procedures   See above and below      Pending Results   These results will be followed up by Addis Adams   Unresulted Labs Ordered in the Past 30 Days of this Admission     No orders found from 8/26/2018 to 10/26/2018.          Code Status   Full Code       Primary Care Physician   Addis Adams    Physical Exam   Temp: 97.7  F (36.5  C) Temp src: Oral BP: 131/52 Pulse: 99 Heart Rate: 62 Resp: 16 SpO2: 90 % O2 Device:  None (Room air)    Vitals:    10/25/18 0755 10/25/18 1313   Weight: 84.4 kg (186 lb) 89.4 kg (197 lb)     Vital Signs with Ranges  Temp:  [96.4  F (35.8  C)-99.7  F (37.6  C)] 97.7  F (36.5  C)  Pulse:  [99] 99  Heart Rate:  [59-65] 62  Resp:  [16-20] 16  BP: (113-162)/(52-68) 131/52  SpO2:  [90 %-93 %] 90 %  I/O last 3 completed shifts:  In: 240 [P.O.:240]  Out: -     Constitutional: Awake, alert, cooperative, no apparent distress.  Is able to sit up on her own accord and ate breakfast.  Eyes: Conjunctiva and pupils examined and normal.  HEENT: Moist mucous membranes, normal dentition.  Respiratory: Clear to auscultation bilaterally, no crackles or wheezing.  Cardiovascular: Regular rate and rhythm, normal S1 and S2, and no murmur noted.  GI: Soft, non-distended, non-tender, normal bowel sounds.  Skin: No rashes, no cyanosis, no edema.  Musculoskeletal: No joint swelling, left upper arm/humerus ecchymosis  Psychiatric: Alert, oriented to person, place and time      Discharge Disposition   Discharged to short-term care facility  Condition at discharge: Stable    Consultations This Hospital Stay   SOCIAL WORK IP CONSULT  PHYSICAL THERAPY ADULT IP CONSULT  ORTHOPEDIC SURGERY IP CONSULT  SOCIAL WORK IP CONSULT  SPIRITUAL HEALTH SERVICES IP CONSULT  OCCUPATIONAL THERAPY ADULT IP CONSULT  PAIN MANAGEMENT ADULT IP CONSULT  PHYSICAL THERAPY ADULT IP CONSULT  OCCUPATIONAL THERAPY ADULT IP CONSULT    Time Spent on this Encounter   INeo, personally saw the patient today and spent greater than 30 minutes discharging this patient.    Discharge Orders     General info for SNF   Length of Stay Estimate: Short Term Care: Estimated # of Days <30  Condition at Discharge: Stable  Level of care:skilled   Rehabilitation Potential: Good  Admission H&P remains valid and up-to-date: Yes  Recent Chemotherapy: N/A  Use Nursing Home Standing Orders: Yes     Mantoux instructions   Give two-step Mantoux (PPD) Per Facility Policy  "Yes     Reason for your hospital stay   Fall with left humeral neck fracture and pubic rami fracture on left     Follow Up and recommended labs and tests   Follow up with Nursing home physician.  No follow up labs or test are needed.     Activity - Up with assistive device   Weight bearing as tolerated.  Left arm in sling at all times (ma remove for bathing)     Activity - Up with nursing assistance     Weight bearing status   As tolerated     Follow Up ([facility])   Patient needs follow-up with orthopedic surgery, she prefers follow-up with the [facility].  Follow-up recommended in 10-14 days     Full Code     Physical Therapy Adult Consult   Evaluate and treat as clinically indicated.    Reason:  Arm and pelvic fracture     Occupational Therapy Adult Consult   Evaluate and treat as clinically indicated.    Reason:  Arm and pelvic fracture     Fall precautions     Advance Diet as Tolerated   Follow this diet upon discharge: Orders Placed This Encounter     Combination Diet Regular Diet Adult       Discharge Medications   Current Discharge Medication List      START taking these medications    Details   acetaminophen (TYLENOL) 500 MG tablet Take 1 tablet (500 mg) by mouth every 8 hours    Associated Diagnoses: Closed fracture of proximal end of left humerus, unspecified fracture morphology, initial encounter; Closed fracture of ramus of left pubis, initial encounter (H)      diclofenac (VOLTAREN) 1 % GEL topical gel Place 2 g onto the skin 4 times daily    Associated Diagnoses: Closed fracture of proximal end of left humerus, unspecified fracture morphology, initial encounter; Closed fracture of ramus of left pubis, initial encounter (H)      hydrOXYzine (ATARAX) 25 MG tablet Take 1 tablet (25 mg) by mouth 3 times daily as needed (pain \"flare\")  Qty: 120 tablet    Associated Diagnoses: Closed fracture of ramus of left pubis, initial encounter (H); Closed fracture of proximal end of left humerus, unspecified " fracture morphology, initial encounter      oxyCODONE IR (ROXICODONE) 5 MG tablet Take 2 tablets (10 mg) by mouth every 4 hours as needed for moderate to severe pain or breakthrough pain  Qty: 40 tablet, Refills: 0    Associated Diagnoses: Closed fracture of proximal end of left humerus, unspecified fracture morphology, initial encounter; Closed fracture of ramus of left pubis, initial encounter (H)      polyethylene glycol (MIRALAX/GLYCOLAX) Packet Take 17 g by mouth daily as needed for constipation  Qty: 7 packet    Associated Diagnoses: Chronic back pain, unspecified back location, unspecified back pain laterality         CONTINUE these medications which have CHANGED    Details   oxyCODONE-acetaminophen (PERCOCET) 7.5-325 MG per tablet Take 1 tablet by mouth 3 times daily for 21 days  Qty: 63 tablet, Refills: 0    Associated Diagnoses: Chronic back pain, unspecified back location, unspecified back pain laterality         CONTINUE these medications which have NOT CHANGED    Details   atorvastatin (LIPITOR) 40 MG tablet Take 40 mg by mouth daily      cyanocobalamin (VITAMIN B12) 1000 MCG/ML injection Inject 1 mL into the muscle every 21 days      ferrous gluconate (FERGON) 324 (38 Fe) MG tablet Take 324 mg by mouth daily (with breakfast)      fexofenadine (ALLEGRA) 180 MG tablet Take 180 mg by mouth daily      gabapentin (NEURONTIN) 300 MG capsule Take 1,200 mg by mouth 3 times daily      omeprazole (PRILOSEC) 20 MG CR capsule Take 40 mg by mouth daily      !! OXcarbazepine (TRILEPTAL) 300 MG tablet Take 600 mg by mouth every morning      !! OXcarbazepine (TRILEPTAL) 300 MG tablet Take 300 mg by mouth every evening      senna (SENOKOT) 8.6 MG tablet Take 1 tablet by mouth daily      sertraline (ZOLOFT) 100 MG tablet Take 150 mg by mouth daily      tiZANidine (ZANAFLEX) 2 MG tablet Take 2 mg by mouth 3 times daily as needed for muscle spasms      Vitamin D, Cholecalciferol, 1000 units TABS Take 3,000 Units by mouth  daily       !! - Potential duplicate medications found. Please discuss with provider.        Allergies   Allergies   Allergen Reactions     Aleve [Naproxen]      Penicillin G      Tape [Adhesive Tape]      Data   Most Recent 3 CBC's:  Recent Labs   Lab Test  10/25/18   0814  09/19/18   1551   WBC  6.6  5.2   HGB  13.9  14.6   MCV  94  93   PLT  178  164      Most Recent 3 BMP's:  Recent Labs   Lab Test  10/25/18   0814  09/19/18   1551   NA  141  139   POTASSIUM  3.8  4.0   CHLORIDE  107  106   CO2  27  24   BUN  23  23   CR  0.77  0.81   ANIONGAP  7  9   BORA  8.6  8.6   GLC  139*  126*     Most Recent 2 LFT's:No lab results found.  Most Recent INR's and Anticoagulation Dosing History:  Anticoagulation Dose History     Recent Dosing and Labs Latest Ref Rng & Units 10/25/2018    INR 0.86 - 1.14 0.93        Most Recent 3 Troponin's:No lab results found.  Most Recent Cholesterol Panel:No lab results found.  Most Recent 6 Bacteria Isolates From Any Culture (See EPIC Reports for Culture Details):No lab results found.  Most Recent TSH, T4 and A1c Labs:No lab results found.  Results for orders placed or performed during the hospital encounter of 10/25/18   XR Pelvis and Hip Left 2 Views    Narrative    XR PELVIS AND HIP LEFT 2 VIEWS  10/25/2018 9:27 AM    HISTORY:  Trauma.    COMPARISON:  9/19/2018.      Impression    IMPRESSION:  Bilateral hip arthroplasties without evidence for  complication. Minimally displaced fractures of the left superior and  inferior pubic rami, new since the prior exam.    SIA SHI MD   Humerus XR,  G/E 2 views, left    Narrative    LEFT SHOULDER THREE OR MORE VIEWS, LEFT HUMERUS TWO OR MORE VIEWS   10/25/2018 9:26 AM     HISTORY: Trauma.     COMPARISON: None.      Impression    IMPRESSION: There is a moderately displaced fracture of the humeral  neck.    STEPHANIE NIETO MD   XR Shoulder Left G/E 3 Views    Narrative    LEFT SHOULDER THREE OR MORE VIEWS, LEFT HUMERUS TWO OR MORE VIEWS    10/25/2018 9:26 AM     HISTORY: Trauma.     COMPARISON: None.      Impression    IMPRESSION: There is a moderately displaced fracture of the humeral  neck.    STEPHANIE NIETO MD   CT Shoulder Left w/o Contrast    Narrative    CT SHOULDER LEFT WITHOUT CONTRAST 10/25/2018 8:03 PM     HISTORY: Evaluate fracture.     TECHNIQUE: Axial scans were performed through the left shoulder with  sagittal and coronal reconstruction. Radiation dose for this scan was  reduced using automated exposure control, adjustment of the mA and/or  kV according to patient size, or iterative reconstruction technique.    COMPARISON: X-ray from 10/25/2018    FINDINGS: Obliquely oriented fracture of the surgical neck of the  humerus with moderate impaction and mild lateral angulation at the  fracture site. The fracture extends into the greater tuberosity. No  intra-articular component. No loose bodies. There is baseline moderate  glenohumeral degenerative changes. No fracture involving the glenoid.  No clavicle fracture.      Impression    IMPRESSION: Impacted, mildly laterally angulated fracture of the  surgical neck of the humerus that does extend into the greater  tuberosity.    RAFAEL GANNON MD

## 2018-10-27 NOTE — PLAN OF CARE
Problem: Patient Care Overview  Goal: Plan of Care/Patient Progress Review  Discharge Planner PT   Patient plan for discharge: Agreeable to TCU- has been at [facility] in the past  Current status: Pt amb 12' with hemiwalker with min assist. Pt transfers supine to sit with CGA with pt able to scoot to EOB with supervision. Pt transfers sit to stand with bed elevated with CGA. Pt transfers stand to sit with CGA with vcs for hand placement.. Pt transfers bed to chair with selvin walker with min assist. Time was spent with readjusting sling.   Barriers to return to prior living situation: lives alone  Recommendations for discharge: TCU per plan established by the PT.  Rationale for recommendations: Pt needing A x 2 for mobility items and would benefit from continued skilled therapy to progress mobility skills.        Entered by: Sol Hopkins 10/27/2018 10:18 AM

## 2018-10-27 NOTE — PLAN OF CARE
Problem: Patient Care Overview  Goal: Plan of Care/Patient Progress Review  Outcome: No Change  Pt continually states pain is 10/10 but falls asleep frequently. A1 with repositioning. Up in chair for a brief amt of time. LUE NWB. CMS intact. Reg diet. Voiding. Pain managed with oxy, ataraxy, tizanidine, IV dilaudid prn. Scheduled gabapentin, voltaren gel. Plan to discharge to tCU tomorrow. Will continue to monitor

## 2018-10-28 ENCOUNTER — APPOINTMENT (OUTPATIENT)
Dept: PHYSICAL THERAPY | Facility: CLINIC | Age: 73
DRG: 563 | End: 2018-10-28
Payer: MEDICARE

## 2018-10-28 VITALS
DIASTOLIC BLOOD PRESSURE: 63 MMHG | BODY MASS INDEX: 36.25 KG/M2 | HEIGHT: 62 IN | OXYGEN SATURATION: 94 % | WEIGHT: 197 LBS | TEMPERATURE: 98.9 F | HEART RATE: 99 BPM | SYSTOLIC BLOOD PRESSURE: 142 MMHG | RESPIRATION RATE: 16 BRPM

## 2018-10-28 PROCEDURE — 25000132 ZZH RX MED GY IP 250 OP 250 PS 637: Mod: GY | Performed by: NURSE PRACTITIONER

## 2018-10-28 PROCEDURE — 97530 THERAPEUTIC ACTIVITIES: CPT | Mod: GP | Performed by: PHYSICAL THERAPY ASSISTANT

## 2018-10-28 PROCEDURE — 97110 THERAPEUTIC EXERCISES: CPT | Mod: GP | Performed by: PHYSICAL THERAPY ASSISTANT

## 2018-10-28 PROCEDURE — 25000132 ZZH RX MED GY IP 250 OP 250 PS 637: Mod: GY | Performed by: INTERNAL MEDICINE

## 2018-10-28 PROCEDURE — 25000132 ZZH RX MED GY IP 250 OP 250 PS 637: Mod: GY | Performed by: PHYSICIAN ASSISTANT

## 2018-10-28 PROCEDURE — 90662 IIV NO PRSV INCREASED AG IM: CPT | Performed by: INTERNAL MEDICINE

## 2018-10-28 PROCEDURE — A9270 NON-COVERED ITEM OR SERVICE: HCPCS | Mod: GY | Performed by: PHYSICIAN ASSISTANT

## 2018-10-28 PROCEDURE — A9270 NON-COVERED ITEM OR SERVICE: HCPCS | Mod: GY | Performed by: NURSE PRACTITIONER

## 2018-10-28 PROCEDURE — A9270 NON-COVERED ITEM OR SERVICE: HCPCS | Mod: GY | Performed by: INTERNAL MEDICINE

## 2018-10-28 PROCEDURE — 25000128 H RX IP 250 OP 636: Performed by: INTERNAL MEDICINE

## 2018-10-28 PROCEDURE — 40000193 ZZH STATISTIC PT WARD VISIT: Performed by: PHYSICAL THERAPY ASSISTANT

## 2018-10-28 RX ADMIN — OXYCODONE HYDROCHLORIDE 10 MG: 5 TABLET ORAL at 09:29

## 2018-10-28 RX ADMIN — SERTRALINE HYDROCHLORIDE 150 MG: 100 TABLET ORAL at 08:11

## 2018-10-28 RX ADMIN — HYDROXYZINE HYDROCHLORIDE 25 MG: 25 TABLET ORAL at 12:59

## 2018-10-28 RX ADMIN — INFLUENZA A VIRUS A/MICHIGAN/45/2015 X-275 (H1N1) ANTIGEN (FORMALDEHYDE INACTIVATED), INFLUENZA A VIRUS A/SINGAPORE/INFIMH-16-0019/2016 IVR-186 (H3N2) ANTIGEN (FORMALDEHYDE INACTIVATED), AND INFLUENZA B VIRUS B/MARYLAND/15/2016 BX-69A (A B/COLORADO/6/2017-LIKE VIRUS) ANTIGEN (FORMALDEHYDE INACTIVATED) 0.5 ML: 60; 60; 60 INJECTION, SUSPENSION INTRAMUSCULAR at 09:29

## 2018-10-28 RX ADMIN — DICLOFENAC SODIUM 2 G: 10 GEL TOPICAL at 08:12

## 2018-10-28 RX ADMIN — DICLOFENAC SODIUM 2 G: 10 GEL TOPICAL at 12:59

## 2018-10-28 RX ADMIN — OXYCODONE HYDROCHLORIDE 10 MG: 5 TABLET ORAL at 00:55

## 2018-10-28 RX ADMIN — HYDROXYZINE HYDROCHLORIDE 25 MG: 25 TABLET ORAL at 04:36

## 2018-10-28 RX ADMIN — ATORVASTATIN CALCIUM 40 MG: 20 TABLET, FILM COATED ORAL at 08:10

## 2018-10-28 RX ADMIN — OXYCODONE HYDROCHLORIDE 10 MG: 5 TABLET ORAL at 12:59

## 2018-10-28 RX ADMIN — ACETAMINOPHEN 1000 MG: 500 TABLET, FILM COATED ORAL at 12:59

## 2018-10-28 RX ADMIN — SENNOSIDES 1 TABLET: 8.6 TABLET, FILM COATED ORAL at 08:11

## 2018-10-28 RX ADMIN — OXYCODONE HYDROCHLORIDE 10 MG: 5 TABLET ORAL at 05:08

## 2018-10-28 RX ADMIN — ACETAMINOPHEN 1000 MG: 500 TABLET, FILM COATED ORAL at 04:36

## 2018-10-28 RX ADMIN — FEXOFENADINE HYDROCHLORIDE 180 MG: 180 TABLET, FILM COATED ORAL at 08:12

## 2018-10-28 RX ADMIN — OMEPRAZOLE 40 MG: 20 CAPSULE, DELAYED RELEASE ORAL at 08:10

## 2018-10-28 RX ADMIN — VITAMIN D, TAB 1000IU (100/BT) 3000 UNITS: 25 TAB at 08:11

## 2018-10-28 RX ADMIN — FERROUS GLUCONATE 324 MG: 324 TABLET ORAL at 08:11

## 2018-10-28 RX ADMIN — OXCARBAZEPINE 600 MG: 300 TABLET ORAL at 08:10

## 2018-10-28 RX ADMIN — GABAPENTIN 1200 MG: 400 CAPSULE ORAL at 08:10

## 2018-10-28 ASSESSMENT — ACTIVITIES OF DAILY LIVING (ADL)
ADLS_ACUITY_SCORE: 13
ADLS_ACUITY_SCORE: 12
ADLS_ACUITY_SCORE: 11
ADLS_ACUITY_SCORE: 13

## 2018-10-28 NOTE — PLAN OF CARE
Problem: Patient Care Overview  Goal: Plan of Care/Patient Progress Review  Outcome: Adequate for Discharge Date Met: 10/28/18  Pt A&O x4. VS stable; afebrile. PO oxycodone, atarax, scheduled tylenol and voltaren gel managing pain. CMS: baseline numbness and tingling in BLE's. Up w/ A1, using gait belt and cane-sling to LUE worn at all times; non-weightbearing. Pivoting to bedside commode. Voiding in good amts. Tolerating regular diet.     Reviewed discharge instructions and medications with patient. Questions answered. Patient discharged to TCU-transported by her son, w/ discharge instruction packet and belongings at this time.

## 2018-10-28 NOTE — PLAN OF CARE
Problem: Patient Care Overview  Goal: Plan of Care/Patient Progress Review  Discharge Planner PT   Patient plan for discharge: Agreeable to TCU- has been at [facility] in the past  Current status: Pt amb 12' with hemiwalker with CGA with step to gait pattern.  Not met Pt transfers supine to sit with supervision to indep. Partially met Pt transfers sit to/from stand with SBA. Pt transfers bed to chair with selvin walker with CGA. Not met  Barriers to return to prior living situation: lives alone  Recommendations for discharge: TCU per plan established by the PT.  Rationale for recommendations: Pt needing A x 1 for mobility items and would benefit from continued skilled therapy to progress mobility skills.     PT - Pt discharging to TCU today per MD orders. Collaborated with PT - goals partially met. Please refer to discharge summary.       Entered by: Sol Hopkins 10/28/2018 7:59 AM

## 2018-10-28 NOTE — PLAN OF CARE
Problem: Patient Care Overview  Goal: Plan of Care/Patient Progress Review  A&O x4. Up w/Ax1 pivot to BSC w/cane, LLE WBAT, left arm NWB in sling. BS/flatus+. Tolerating regular diet well. Voiding well. CMS intact. SL. Pain managed w/ cold application, PRN oxycodone, vistaril and scheduled Tylenol. Plan is to discharge to [facility] TCU at 1:00pm today.

## 2018-10-28 NOTE — PLAN OF CARE
Problem: Patient Care Overview  Goal: Plan of Care/Patient Progress Review  Outcome: No Change  VS-stable afebrile.   Lung Sounds-clear, no  Cough, on room  Air, using IS upto 1000  O2-on room air  GI-on reg diet and tolerating. +bs, +flatus, lbm was 10/24,   -voiding  Via bsc. Pivot transfer  IVF-sl  Dressings-none  CMS-denies numbness and  Tinging to arms and legs. L arm in sling. Increased pain  With moving or  Getting up. +radial  Pulse, strong hand grasp, +pp, strong dorsi/planter flexion. scds on  In bed. Ice  To pelvis.   Drain-none  Activity-up to  Pivot to bs, with one assist. wbat with walker, LUE--NWB.   Pain-rates pain level a 10with moving  Or when up. And a 0 when at  Rest laying in bed. Taking scheduled tylenol, voltarin  Gel, neurontin,. Also taking  Oxycodone, and visteril prn. Sleeping in between cares. More pain in shoulder.   D/C Plan-tcu  To [facility] with family transport at 1300 tomorrow.   K  Was 3.8, MG 1.8,

## 2019-08-20 ENCOUNTER — APPOINTMENT (OUTPATIENT)
Dept: GENERAL RADIOLOGY | Facility: CLINIC | Age: 74
End: 2019-08-20
Attending: EMERGENCY MEDICINE
Payer: MEDICARE

## 2019-08-20 ENCOUNTER — HOSPITAL ENCOUNTER (EMERGENCY)
Facility: CLINIC | Age: 74
Discharge: HOME | End: 2019-08-20
Attending: EMERGENCY MEDICINE | Admitting: EMERGENCY MEDICINE
Payer: MEDICARE

## 2019-08-20 VITALS
HEART RATE: 63 BPM | BODY MASS INDEX: 31.65 KG/M2 | HEIGHT: 65 IN | WEIGHT: 190 LBS | TEMPERATURE: 98 F | OXYGEN SATURATION: 95 % | DIASTOLIC BLOOD PRESSURE: 68 MMHG | RESPIRATION RATE: 18 BRPM | SYSTOLIC BLOOD PRESSURE: 143 MMHG

## 2019-08-20 DIAGNOSIS — S73.005A DISLOCATION OF LEFT HIP, INITIAL ENCOUNTER (H): ICD-10-CM

## 2019-08-20 PROCEDURE — 99285 EMERGENCY DEPT VISIT HI MDM: CPT | Mod: 25

## 2019-08-20 PROCEDURE — 96376 TX/PRO/DX INJ SAME DRUG ADON: CPT

## 2019-08-20 PROCEDURE — 25000128 H RX IP 250 OP 636: Performed by: EMERGENCY MEDICINE

## 2019-08-20 PROCEDURE — 40000275 ZZH STATISTIC RCP TIME EA 10 MIN

## 2019-08-20 PROCEDURE — 99152 MOD SED SAME PHYS/QHP 5/>YRS: CPT

## 2019-08-20 PROCEDURE — 40000965 ZZH STATISTIC END TITIAL CO2 MONITORING

## 2019-08-20 PROCEDURE — 96375 TX/PRO/DX INJ NEW DRUG ADDON: CPT

## 2019-08-20 PROCEDURE — 27265 TREAT HIP DISLOCATION: CPT | Mod: LT

## 2019-08-20 PROCEDURE — 96374 THER/PROPH/DIAG INJ IV PUSH: CPT | Mod: 59

## 2019-08-20 PROCEDURE — 73502 X-RAY EXAM HIP UNI 2-3 VIEWS: CPT

## 2019-08-20 PROCEDURE — 40000986 XR HIP LEFT 2-3 VIEWS

## 2019-08-20 PROCEDURE — 96361 HYDRATE IV INFUSION ADD-ON: CPT | Mod: 59

## 2019-08-20 RX ORDER — NALOXONE HYDROCHLORIDE 0.4 MG/ML
.1-.4 INJECTION, SOLUTION INTRAMUSCULAR; INTRAVENOUS; SUBCUTANEOUS
Status: DISCONTINUED | OUTPATIENT
Start: 2019-08-20 | End: 2019-08-20 | Stop reason: HOSPADM

## 2019-08-20 RX ORDER — PROPOFOL 10 MG/ML
INJECTION, EMULSION INTRAVENOUS
Status: DISCONTINUED
Start: 2019-08-20 | End: 2019-08-20 | Stop reason: WASHOUT

## 2019-08-20 RX ORDER — PROPOFOL 10 MG/ML
20 INJECTION, EMULSION INTRAVENOUS ONCE
Status: COMPLETED | OUTPATIENT
Start: 2019-08-20 | End: 2019-08-20

## 2019-08-20 RX ORDER — PROPOFOL 10 MG/ML
1 INJECTION, EMULSION INTRAVENOUS ONCE
Status: COMPLETED | OUTPATIENT
Start: 2019-08-20 | End: 2019-08-20

## 2019-08-20 RX ORDER — FLUMAZENIL 0.1 MG/ML
0.2 INJECTION, SOLUTION INTRAVENOUS
Status: DISCONTINUED | OUTPATIENT
Start: 2019-08-20 | End: 2019-08-20 | Stop reason: HOSPADM

## 2019-08-20 RX ORDER — ONDANSETRON 2 MG/ML
4 INJECTION INTRAMUSCULAR; INTRAVENOUS EVERY 30 MIN PRN
Status: DISCONTINUED | OUTPATIENT
Start: 2019-08-20 | End: 2019-08-20 | Stop reason: HOSPADM

## 2019-08-20 RX ORDER — PROPOFOL 10 MG/ML
30 INJECTION, EMULSION INTRAVENOUS ONCE
Status: COMPLETED | OUTPATIENT
Start: 2019-08-20 | End: 2019-08-20

## 2019-08-20 RX ADMIN — PROPOFOL 20 MG: 10 INJECTION, EMULSION INTRAVENOUS at 12:09

## 2019-08-20 RX ADMIN — PROPOFOL 30 MG: 10 INJECTION, EMULSION INTRAVENOUS at 12:08

## 2019-08-20 RX ADMIN — ONDANSETRON 4 MG: 2 INJECTION INTRAMUSCULAR; INTRAVENOUS at 10:13

## 2019-08-20 RX ADMIN — HYDROMORPHONE HYDROCHLORIDE 1 MG: 1 INJECTION, SOLUTION INTRAMUSCULAR; INTRAVENOUS; SUBCUTANEOUS at 10:13

## 2019-08-20 RX ADMIN — HYDROMORPHONE HYDROCHLORIDE 1 MG: 1 INJECTION, SOLUTION INTRAMUSCULAR; INTRAVENOUS; SUBCUTANEOUS at 11:25

## 2019-08-20 RX ADMIN — SODIUM CHLORIDE 1000 ML: 9 INJECTION, SOLUTION INTRAVENOUS at 12:00

## 2019-08-20 RX ADMIN — PROPOFOL 60 MG: 10 INJECTION, EMULSION INTRAVENOUS at 12:04

## 2019-08-20 ASSESSMENT — ENCOUNTER SYMPTOMS: ARTHRALGIAS: 1

## 2019-08-20 ASSESSMENT — MIFFLIN-ST. JEOR: SCORE: 1372.71

## 2019-08-20 NOTE — DISCHARGE INSTRUCTIONS
Discharge Instructions  Procedural Sedation    During your visit today you had Procedural Sedation which is the process used to give you medications in order to make you comfortable or relaxed while a painful or difficult procedure is performed. This might have been a wound repair, fracture reduction ( setting a broken bone ), relocation of a dislocated joint, or other procedure.    The medications used for Procedural Sedation are generally very short-acting so you are being discharged at a time when it is most likely that the medications have completely left your body. It is possible that the medication could cause some persistent symptoms like nausea, drowsiness, dizziness, clumsiness/poor balance, or poor judgment. As a result, please do not drive, operate machinery/tools, or do anything else that requires judgment or coordination for the rest of the day. This could include climbing ladders or other heights, swimming/bathing, exercising, bicycling, or other similar activities. If you are feeling back to normal, you may resume normal activities the following day.    Generally, every Emergency Department visit should have a follow-up clinic visit with either a primary or a specialty clinic/provider. Please follow-up as instructed by your emergency provider today.  Return to the Emergency Department right away if:  You have difficulty breathing.  Your friends or family feel that you are too sleepy/sedated.  You have repeated vomiting.    Home care:  Do not drink alcohol or take sedating (sleeping) medications.  Take pain medications only as instructed by your provider.  Begin with a light diet (like clear liquids) and add more foods as your stomach tolerates.  If you have vomiting, return to clear liquids.    Follow-up:  Follow-up was recommended based on the condition that you received Procedural Sedation for; follow-up according to the instructions you received from your provider today.      If you were given a  prescription for medicine here today, be sure to read all of the information (including the package insert) that comes with your prescription. This will include important information about the medicine, its side effects, and any warnings that you need to know about. The pharmacist who fills the prescription can provide more information and answer questions you may have about the medicine.  If you have questions or concerns that the pharmacist cannot address, please call or return to the Emergency Department.     Remember that you can always come back to the Emergency Department if you are not able to see your regular provider in the amount of time listed above, if you get any new symptoms, or if there is anything that worries you.

## 2019-08-20 NOTE — PROGRESS NOTES
"An ETCO2 monitor was placed on the pt with 4 LPM bled in. The Ambu bag, suction, and airways were all setup and present in the room. An nasal airway along with some Ambu bagging was needed for a short period to maintain her sats and breathing. ETCO2 levels were maintained between 0 and 47,     Vital signs:  Temp: 98  F (36.7  C)   BP: 119/60 Pulse: 69 Heart Rate: 68 Resp: 18 SpO2: 93 % O2 Device: Nasal cannula Oxygen Delivery: 2 LPM Height: 165.1 cm (5' 5\") Weight: 86.2 kg (190 lb)  Estimated body mass index is 31.62 kg/m  as calculated from the following:    Height as of this encounter: 1.651 m (5' 5\").    Weight as of this encounter: 86.2 kg (190 lb).    Past Medical History:   Diagnosis Date     Herniated disc, cervical        Past Surgical History:   Procedure Laterality Date     ORTHOPEDIC SURGERY         No family history on file.    Social History     Tobacco Use     Smoking status: Current Every Day Smoker     Packs/day: 1.50     Smokeless tobacco: Never Used   Substance Use Topics     Alcohol use: No     Sj Ivy RT RT  8/20/2019    "

## 2019-08-20 NOTE — ED TRIAGE NOTES
Patient presents with complaints of left hip pain after a fall. Patient states that she has history of hip dislocations in same hip. Patient states that this feels similar. Patient received 100 mcg of Fentanyl intranasal PTA. Denies hitting head, LOC , or pain anywhere else.  ABC intact without need for intervention at this time.

## 2019-08-20 NOTE — ED PROVIDER NOTES
History     Chief Complaint:  Fall and Hip Pain         Malou Mccollum is a 73 year old female with a history of hypertension, hyperlipidemia, diabetes neopathy, and osteoarthritis  who presents with a fall and hip pain. The patient states that she was watering her plants this morning when her left hip popped, causing her to fall onto her left side. The patient states that she could not get up and crawled to her neighbors to get help. The patient notes that she last ate at 0700 and drink at 0900.     Allergies:  Acrylic  Aleve  Dextrans  penicillins  varencline  Adhesives    Medications:    Gabapentin  Lipitor  Seroquel   Zoloft  Trileptal  Tizanidine  Prilosec  Atarax    Past Medical History:    Fatty liver  OA  Lumbar disc  Bipolar disorder  Hyperlipidemia  GERD  Neuropathy   Diabetes   Hypertension    Past Surgical History:    Gastric bypass  Tonsillectomy and adenoidectomy   C section   Ovarian cyst surgery   Tubal ligation   appendectomy  Nose surgery  Chin implant   Eye surgery   Knee surgery   Total hip arthroplasty X2     Family History:    Stroke- father    Social History:  Smoking Status: current everyday smoker   Smokeless Tobacco: Never Used  Alcohol Use: Positive  Marital Status:        Review of Systems   Musculoskeletal: Positive for arthralgias.   All other systems reviewed and are negative.        Physical Exam     Patient Vitals for the past 24 hrs:   BP Temp Pulse Heart Rate Resp SpO2 Height Weight   08/20/19 1235 119/60 -- 69 68 -- 93 % -- --   08/20/19 1233 -- -- -- 67 -- 95 % -- --   08/20/19 1230 124/67 -- 69 67 -- 95 % -- --   08/20/19 1225 133/67 -- 66 69 -- 93 % -- --   08/20/19 1220 133/68 -- 68 69 -- 96 % -- --   08/20/19 1215 133/60 -- -- 64 -- 96 % -- --   08/20/19 1215 136/64 -- 66 64 -- 95 % -- --   08/20/19 1213 133/60 -- 64 63 -- 97 % -- --   08/20/19 1210 133/60 -- 64 71 -- 90 % -- --   08/20/19 1209 -- -- -- 74 -- 95 % -- --   08/20/19 1206 -- -- -- 64 -- 99 % -- --  "  08/20/19 1205 115/88 -- 70 62 -- 95 % -- --   08/20/19 1203 -- -- -- 71 -- 96 % -- --   08/20/19 1200 (!) 146/63 -- 73 70 -- 93 % -- --   08/20/19 1145 -- -- -- -- -- 96 % -- --   08/20/19 1130 134/66 -- 63 -- -- 98 % -- --   08/20/19 1100 -- -- 78 -- -- 99 % -- --   08/20/19 1045 (!) 132/95 -- 63 -- -- -- -- --   08/20/19 1030 129/75 -- 65 -- -- 98 % -- --   08/20/19 1015 (!) 161/76 -- 67 -- -- 99 % -- --   08/20/19 1000 135/76 -- 69 -- -- 99 % -- --   08/20/19 0955 -- -- -- -- -- -- 1.651 m (5' 5\") 86.2 kg (190 lb)   08/20/19 0954 (!) 163/91 98  F (36.7  C) 72 -- 18 98 % -- --         Physical Exam   Constitutional: She appears well-developed and well-nourished.   HENT:   Right Ear: External ear normal.   Left Ear: External ear normal.   Mouth/Throat: Oropharynx is clear and moist. No oropharyngeal exudate.   TM's clear bilaterally   Eyes: Pupils are equal, round, and reactive to light. Conjunctivae are normal. No scleral icterus.   Neck: Normal range of motion. Neck supple.   Cardiovascular: Normal rate, regular rhythm, normal heart sounds and intact distal pulses. Exam reveals no gallop and no friction rub.   No murmur heard.  Pulmonary/Chest: Effort normal and breath sounds normal. No respiratory distress. She has no wheezes. She has no rales.   Abdominal: Soft. Bowel sounds are normal. She exhibits no distension and no mass. There is no tenderness.   Musculoskeletal: She exhibits tenderness. She exhibits no edema.   Left hip internally rotated and shortened. Pain over inguinal region. 2+ pulses in BLE   Neurological: She is alert.   Skin: Skin is warm and dry. No rash noted.   Psychiatric: She has a normal mood and affect.       Emergency Department Course     Imaging:  Radiology findings were communicated with the patient who voiced understanding of the findings.    XR  Pelvis and Hip:  Superior dislocation of the femoral component of a left  total hip arthroplasty. No evidence of acute fracture.  Reading " per radiology    XR hip left:  Two views in AP and lateral projection were obtained and  demonstrate the femoral head component now properly located within the  acetabular component. No acute fracture identified.  Reading per radiology    [facility]    ED Orthopedic Injury Treatment - Fracture  Date/Time: 8/20/2019 11:40 AM  Performed by: Jace Cobb MD  Authorized by: Jace Cobb MD     ED EVALUATION:      Assessment Time: 8/20/2019 11:40 AM      Provisional Diagnosis: hip dislocation    Difficult Airway?: No    ASA Class: Class 2- mild systemic disease, no acute problems, no functional limitations    NPO Status: appropriately NPO for procedure  UNIVERSAL PROTOCOL   Site Marked: No  Prior Images Obtained and Reviewed:  Yes  Required items: Required blood products, implants, devices and special equipment available    Patient identity confirmed:  Verbally with patient  NA - No sedation, light sedation, or local anesthesia  Confirmation Checklist:  Patient's identity using two indicators  Time out: Immediately prior to the procedure a time out was called    Preparation: Patient was prepped and draped in usual sterile fashion      INJURY      Injury location:  Hip    Hip injury location:  L hip    PRE PROCEDURE ASSESSMENT      Neurological function: normal      Distal perfusion: normal      Range of motion: reduced      SEDATION    Patient Sedated: Yes    Sedation Type:  Deep  Sedation:  Propofol  Vital signs: Vital signs monitored during sedation      ANESTHESIA (see MAR for exact dosages)      Anesthesia method:  None    PROCEDURE DETAILS:     Manipulation performed: yes      Skin traction used: no      Skeletal traction used: no      Pin inserted: no      Reduction successful: yes      X-ray confirmed reduction: yes      MAST used: no      Immobilization:  Brace    POST PROCEDURE ASSESSMENT      Neurological function: normal      Distal perfusion: normal      Range of motion: normal       PROCEDURE   Patient complications:  Apnea  Respiratory Interventions:  Need for positive pressure ventilation  Time of Sedation in Minutes by Physician:  20    Dislocation Reduction     SITE: left hip     CONSENT: Risks and benefits, along with alternative treatment modalities, were discussed with the patient.  The patient consented to the procedure verbally and signed the proper paperwork.    ANESTHESIA:  The patient was consciously sedated by Dr. Cobb (please see sedation note)    TECHNIQUE: Hip and knee in 90 degree position, forces applied and inguinal region and traction downward placed on the lower leg. Pop heard and the dislocatio is reduced The patient tolerated the procedure well and there were no complications.     OUTCOME:  Rechecked the patient after sedation was no longer present, findings and plan explained to the patient. Patients status improved.  Pain was reduced and feeling more comfortably.      Interventions:  1013 Zofran 4 mg IV  1013 Dilaudid 1 mg IV  1125 Dilaudid 1 mg IV  1200 NS, 1 L, IV  1204 propofol 60 mg IV  1208 propofol 30 mg IV  1209 propofol 20 mg IV    Emergency Department Course:     Nursing notes and vitals reviewed.    1007 I performed an exam of the patient as documented above.     1106 The patient was sent for a XR pelvis and left hip while in the emergency department, results above.     1138 I returned to check on patient. I updated the patient on the results of her XR and need for reduction.    1150 I reduced the patient's hip, see procedure note above.     1238 I returned to check on patient.     1251 The patient was sent for a hip XR while in the emergency department, results above.     1330 I personally reviewed the imaging results with the patient and answered all related questions prior to discharge.    Impression & Plan      Medical Decision Making:  Malou Mccollum is a 73 year old female who presents to the emergency department today for evaluation of left hip  dislocation while stepping out of her house this morning. She has had recurrent dislocations always on this left hip. It is obviously anteriorly rotated on exam and shortened. XR did confirm a anterior-superior dislocation. She agreed to sedation to reduce her hip. I reviewed her notes from last time and she apparently did have oxygen desaturation down to 74% with propofol. She notified me that she has had problem with getting too much medication so we decided  to start at 60 mg. Even with that dose she started to de-sat to 85%. We were able to bag her up and re bolused her twice with smaller doses of propofol. However, even with this small doses she was still had 2 more episodes where she went down to the mid 80's and we needed to bag her up. So she definitely is very much affected by propofol. It may be wise to chooses a different sedating medication next time should she dislocate her hip again. I did require assistance from Dr. Lezama to place her hip back as she was quite a large lady. She reduced and a knee immobilizer  was placed. She has a walker at home she will be using. She will be going home with her family.     Diagnosis:    ICD-10-CM    1. Dislocation of left hip, initial encounter (H) S73.005A      Disposition:   The patient is discharged to home.    Scribe Disclosure:  Adali SUBRAMANIAN, am serving as a scribe at 10:11 AM on 8/20/2019 to document services personally performed by Jace Cobb MD based on my observations and the provider's statements to me.  [facility] EMERGENCY DEPARTMENT       Jace Cobb MD  08/20/19 3743

## 2019-08-20 NOTE — ED AVS SNAPSHOT
[facility] Emergency Department  [address]  [facility] [address]  Phone:  [phone]  Fax:  [phone]                                    Malou Mccollum   MRN: [id number]    Department:  [facility] Emergency Department   Date of Visit:  8/20/2019           After Visit Summary Signature Page    I have received my discharge instructions, and my questions have been answered. I have discussed any challenges I see with this plan with the nurse or doctor.    ..........................................................................................................................................  Patient/Patient Representative Signature      ..........................................................................................................................................  Patient Representative Print Name and Relationship to Patient    ..................................................               ................................................  Date                                   Time    ..........................................................................................................................................  Reviewed by Signature/Title    ...................................................              ..............................................  Date                                               Time          22EPIC Rev 08/18

## 2019-11-23 ENCOUNTER — APPOINTMENT (OUTPATIENT)
Dept: GENERAL RADIOLOGY | Facility: CLINIC | Age: 74
End: 2019-11-23
Attending: EMERGENCY MEDICINE
Payer: MEDICARE

## 2019-11-23 ENCOUNTER — HOSPITAL ENCOUNTER (EMERGENCY)
Facility: CLINIC | Age: 74
Discharge: HOME | End: 2019-11-23
Attending: EMERGENCY MEDICINE | Admitting: EMERGENCY MEDICINE
Payer: MEDICARE

## 2019-11-23 VITALS
WEIGHT: 196 LBS | TEMPERATURE: 97.9 F | RESPIRATION RATE: 18 BRPM | HEIGHT: 61 IN | OXYGEN SATURATION: 93 % | HEART RATE: 63 BPM | DIASTOLIC BLOOD PRESSURE: 78 MMHG | BODY MASS INDEX: 37 KG/M2 | SYSTOLIC BLOOD PRESSURE: 181 MMHG

## 2019-11-23 DIAGNOSIS — T84.029A DISLOCATION OF HIP JOINT PROSTHESIS, INITIAL ENCOUNTER (H): ICD-10-CM

## 2019-11-23 DIAGNOSIS — Z96.649 DISLOCATION OF HIP JOINT PROSTHESIS, INITIAL ENCOUNTER (H): ICD-10-CM

## 2019-11-23 PROCEDURE — 40000965 ZZH STATISTIC END TITIAL CO2 MONITORING

## 2019-11-23 PROCEDURE — 27265 TREAT HIP DISLOCATION: CPT | Mod: LT

## 2019-11-23 PROCEDURE — 73502 X-RAY EXAM HIP UNI 2-3 VIEWS: CPT

## 2019-11-23 PROCEDURE — 99285 EMERGENCY DEPT VISIT HI MDM: CPT | Mod: 25

## 2019-11-23 PROCEDURE — 40000986 XR PELVIS AND HIP LEFT 1 VIEW

## 2019-11-23 PROCEDURE — 25000128 H RX IP 250 OP 636: Performed by: EMERGENCY MEDICINE

## 2019-11-23 PROCEDURE — 40000275 ZZH STATISTIC RCP TIME EA 10 MIN

## 2019-11-23 PROCEDURE — 96374 THER/PROPH/DIAG INJ IV PUSH: CPT | Mod: 59

## 2019-11-23 RX ORDER — PROPOFOL 10 MG/ML
200 INJECTION, EMULSION INTRAVENOUS ONCE
Status: COMPLETED | OUTPATIENT
Start: 2019-11-23 | End: 2019-11-23

## 2019-11-23 RX ORDER — FENTANYL CITRATE 50 UG/ML
100 INJECTION, SOLUTION INTRAMUSCULAR; INTRAVENOUS ONCE
Status: DISCONTINUED | OUTPATIENT
Start: 2019-11-23 | End: 2019-11-23 | Stop reason: HOSPADM

## 2019-11-23 RX ORDER — HYDROCODONE BITARTRATE AND ACETAMINOPHEN 5; 325 MG/1; MG/1
1 TABLET ORAL EVERY 4 HOURS PRN
Qty: 12 TABLET | Refills: 0 | Status: SHIPPED | OUTPATIENT
Start: 2019-11-23 | End: 2019-11-26

## 2019-11-23 RX ADMIN — PROPOFOL 20 MG: 10 INJECTION, EMULSION INTRAVENOUS at 09:07

## 2019-11-23 RX ADMIN — PROPOFOL 20 MG: 10 INJECTION, EMULSION INTRAVENOUS at 09:04

## 2019-11-23 RX ADMIN — PROPOFOL 40 MG: 10 INJECTION, EMULSION INTRAVENOUS at 09:02

## 2019-11-23 ASSESSMENT — MIFFLIN-ST. JEOR: SCORE: 1331.43

## 2019-11-23 ASSESSMENT — ENCOUNTER SYMPTOMS
WEAKNESS: 0
NUMBNESS: 0

## 2019-11-23 NOTE — ED NOTES
Bed: ED02  Expected date: 11/23/19  Expected time: 7:32 AM  Means of arrival: Ambulance  Comments:  BV1

## 2019-11-23 NOTE — ED AVS SNAPSHOT
[facility] Emergency Department  [address]  [facility] [address]  Phone:  [phone]  Fax:  [phone]                                    Malou Mccollum   MRN: [id number]    Department:  [facility] Emergency Department   Date of Visit:  11/23/2019           After Visit Summary Signature Page    I have received my discharge instructions, and my questions have been answered. I have discussed any challenges I see with this plan with the nurse or doctor.    ..........................................................................................................................................  Patient/Patient Representative Signature      ..........................................................................................................................................  Patient Representative Print Name and Relationship to Patient    ..................................................               ................................................  Date                                   Time    ..........................................................................................................................................  Reviewed by Signature/Title    ...................................................              ..............................................  Date                                               Time          22EPIC Rev 08/18

## 2019-11-23 NOTE — SEDATION DOCUMENTATION
Nasal trumpet placed at 0904 by RT. Patient tolerated. Patient remained on 15L simple face mask throughout the procedure. Vitals stable.

## 2019-11-23 NOTE — ED NOTES
Pt ambulated in the hallway with walker and knee immobilizer. Pt denied dizziness or SOB. Gait steady. Pt denied pain while walking. Provider notified.

## 2019-11-23 NOTE — PROGRESS NOTES
An ETCO2 monitor was placed on the pt with 15 LPM bled in. The Ambu bag, suction, and airways were setup and present in the room,  Size 28 nasal trumpet inserted into R nares per physician request. Pt was able to maintain airway throughout the procedure with no intervention needed except for nasal trumpet. ETCO2 levels were maintained between 23-38, SPO2 98%-100%, HR 63-70, RR 15-23, Pt tolerated closed L hip reduction well and was awake and conversing appropriately after procedure, No problems.[id number].

## 2019-11-23 NOTE — ED PROVIDER NOTES
History     Chief Complaint:  Fall      HPI   Malou Mccollum is a 74 year old female who presents after a fall at 2100 last night. She was on the toilet and then slid onto her left side off the toilet. She did not strike her head or lose consciousness. She dragged herself across the hardwood floors and onto the carpet. She watched TV until she thought her son was awake and then she called him. EMS was then called. She now endorses left hip pain which does not radiate. The pain has been constant since the fall. This morning she took two 600 mg gabapentin, eye drops for cataracts and one Percocet for her back. Patient does have a history of her left hip prosthesis dislocation x 5 with closed reduction. The last time this occurred was 8/20/19 where she underwent a left hip reduction in the ER. She underwent left hip revision on 10/21/19 at [facility] due to recurrent dislocation.     Allergies:  Acrylic  Aleve  Dextrans  penicillins  varencline  Adhesives     Medications:    Gabapentin  Lipitor  Seroquel   Zoloft  Trileptal  Tizanidine  Prilosec  Atarax     Past Medical History:    Fatty liver  OA  Lumbar disc  Bipolar disorder  Hyperlipidemia  GERD  Neuropathy   Diabetes   Hypertension     Past Surgical History:    Gastric bypass  Tonsillectomy and adenoidectomy   C section   Ovarian cyst surgery   Tubal ligation   appendectomy  Nose surgery  Chin implant   Eye surgery   Knee surgery   Total hip arthroplasty X2      Family History:    Stroke- father     Social History:  Smoking Status: current everyday smoker   Smokeless Tobacco: Never Used  Alcohol Use: Positive  Marital Status:          Review of Systems   Musculoskeletal:        Positive for left hip pain.    Neurological: Negative for syncope, weakness and numbness.   All other systems reviewed and are negative.      Physical Exam   First Vitals:  BP: (!) 111/92  Pulse: 72  Heart Rate: 63  Temp: 97.9  F (36.6  C)  Resp: 18  Height: 154.9 cm (5'  "1\")  Weight: 88.9 kg (196 lb)  SpO2: 99 %      Physical Exam    GENERAL:  Pleasant, age appropriate.   HEENT:   No scalp hematoma or defect to the bony calvarium.      Castillo's and Racoon's sign negative.      Oropharynx is moist, without lesions or trismus.  EYES:  Conjunctiva normal  NECK:   C-spine non-tender    No bony step-off to cervical spine.   CV:    Regular rate and rhythm.     No murmurs, rubs or gallops.      2+ DP pulses bilateral  PULM:  Clear to auscultation bilateral.      No respiratory distress.      No subcutaneous emphysema or crepitus.  ABD:   Soft, non-tender, non-distended.      No pulsatile masses.  No rebound or guarding.  MSK:    Pelvis stable    LLE:     Shortened and internally rotated     Mild tenderness to the hip     Severely limited ROM of the hip with pain     No knee or lower leg tenderness    Upper extremities and RLE taken through full ROM without significant pain or limited ROM.  LYMPH:  No cervical lymphadenopathy.  NEURO:  Alert and oriented x 3. GCS 15.      Strength and sensation intact to LLE    No tremor  SKIN:   Warm, dry and intact.    PSYCH:   Mood is good and affect is appropriate.      Emergency Department Course     Imaging:  Radiographic findings were communicated with the patient who voiced understanding of the findings.    XR Pelvis and Hip Left 1 View   Final Result   IMPRESSION:  Dislocation of the left total hip arthroplasty. The   femoral component is dislocated cephalad in relation to the acetabular   component. No other change.      SOUMYA ANTONIO MD      XR Pelvis and Hip Left 1 View    (Results Pending)       [facility]    Procedure: Sedation and Reduction  Date/Time: 11/23/2019 8:39 AM  Performed by: Kd Lezama MD  Authorized by: Kd Lezama MD     UNIVERSAL PROTOCOL   Site Marked: NA  Prior Images Obtained and Reviewed:  Yes  Required items: Required blood products, implants, devices and special equipment available  "   Patient identity confirmed:  Verbally with patient, arm band and hospital-assigned identification number  Patient was reevaluated immediately before administering moderate or deep sedation or anesthesia  Confirmation Checklist:  Patient's identity using two indicators, relevant allergies, procedure was appropriate and matched the consent or emergent situation and correct equipment/implants were available  Time out: Immediately prior to the procedure a time out was called    Preparation: Patient was prepped and draped in usual sterile fashion      SEDATION    Patient Sedated: Yes    Sedation:  See MAR for details and propofol  Vital signs: Vital signs monitored during sedation    PROCEDURE   Patient Tolerance:  Patient tolerated the procedure well with no immediate complications  Describe Procedure: Sedation was maintained until the procedure was complete. The patient was monitored by staff until recovered.  Length of time physician/provider present for 1:1 monitoring during sedation: [address] [place] [address]    -Dislocation - Lower Extremity  Date/Time: 11/23/2019 8:42 AM  Performed by: Kd Lezama MD  Authorized by: Kd Lezama MD     UNIVERSAL PROTOCOL   Site Marked: Yes  Prior Images Obtained and Reviewed:  Yes  Required items: Required blood products, implants, devices and special equipment available    Patient identity confirmed:  Verbally with patient, arm band and hospital-assigned identification number  Confirmation Checklist:  Patient's identity using two indicators, procedure was appropriate and matched the consent or emergent situation and correct equipment/implants were available  Time out: Immediately prior to the procedure a time out was called      LOCATION     Location:  Hip    Hip injury location:  L hip    Etiology: traumatic      Prosthesis: yes      PRE PROCEDURE DETAILS:     Distal perfusion: normal      Range of motion: reduced      PROCEDURE DETAILS      Manipulation  performed: yes      Hip reduction method:  Traction and counter traction and external rotation    Reduction successful: yes      Reduction confirmed with imaging: yes      Immobilization:  Brace    POST PROCEDURE DETAILS      Neurological function: normal      Distal perfusion: normal      Range of motion: improved      PROCEDURE   Patient Tolerance:  Patient tolerated the procedure well with no immediate complications        Interventions:  0839: Fentanyl, 100 mcg, IV    Emergency Department Course:  The patient arrived in triage where vitals were measured and recorded.   The patient was then escorted back to the emergency department.   The patient's medical records were reviewed.  Nursing notes and vitals were reviewed.  0741: I performed an exam of the patient as documented above.    The patient was sent for an XR while in the emergency department, results above.   0852: I rechecked the patient and discussed results.  0905: I performed the sedation and reduction; see procedure note above.   1005: I rechecked the patient.    Findings and plan explained to the Patient. Patient discharged home, status improved, with instructions regarding supportive care, medications, and reasons to return as well as the importance of close follow-up was reviewed. Patient was prescribed Norco.     Impression & Plan      Medical Decision Makin-year-old female presented to the ED with acute traumatic left hip prosthesis dislocation.  This is a recurrent dislocation despite her recent hip revision.  Patient underwent conscious sedation with reduction without difficulty.  Knee immobilizer placed.  Patient was able to ambulate with walker afterwards.  No evidence of associated neurovascular injury.  Patient safe for discharge home with close follow-up with her orthopedic surgeon.    Diagnosis:    ICD-10-CM    1. Dislocation of hip joint prosthesis, initial encounter (H) T84.029A     Z96.649        Disposition:  Discharged to  home.     Discharge Medications:  New Prescriptions    HYDROCODONE-ACETAMINOPHEN (NORCO) 5-325 MG TABLET    Take 1 tablet by mouth every 4 hours as needed for severe pain       I, Carlita Whitmore, am serving as a scribe on 11/23/2019 at 7:41 AM to personally document services performed by Dr. Lezama based on my observations and the provider's statements to me.   [facility] EMERGENCY DEPARTMENT       Kd Lezama MD  11/23/19 1024

## 2019-11-23 NOTE — ED TRIAGE NOTES
ABCs intact. Pt hx bilateral hip replacements. Pt fell off a toilet last night around 2100. Pt crawled to bed. Pt c/o L hip pain.

## 2019-12-04 ENCOUNTER — APPOINTMENT (OUTPATIENT)
Dept: GENERAL RADIOLOGY | Facility: CLINIC | Age: 74
End: 2019-12-04
Attending: EMERGENCY MEDICINE
Payer: MEDICARE

## 2019-12-04 ENCOUNTER — HOSPITAL ENCOUNTER (EMERGENCY)
Facility: CLINIC | Age: 74
Discharge: HOME | End: 2019-12-05
Attending: EMERGENCY MEDICINE | Admitting: EMERGENCY MEDICINE
Payer: MEDICARE

## 2019-12-04 DIAGNOSIS — S73.005A CLOSED DISLOCATION OF LEFT HIP, INITIAL ENCOUNTER (H): ICD-10-CM

## 2019-12-04 LAB
ALBUMIN SERPL-MCNC: 3.1 G/DL (ref 3.4–5)
ALP SERPL-CCNC: 119 U/L (ref 40–150)
ALT SERPL W P-5'-P-CCNC: 19 U/L (ref 0–50)
ANION GAP SERPL CALCULATED.3IONS-SCNC: 7 MMOL/L (ref 3–14)
AST SERPL W P-5'-P-CCNC: 34 U/L (ref 0–45)
BASOPHILS # BLD AUTO: 0 10E9/L (ref 0–0.2)
BASOPHILS NFR BLD AUTO: 0.6 %
BILIRUB SERPL-MCNC: 0.2 MG/DL (ref 0.2–1.3)
BUN SERPL-MCNC: 14 MG/DL (ref 7–30)
CALCIUM SERPL-MCNC: 8.5 MG/DL (ref 8.5–10.1)
CHLORIDE SERPL-SCNC: 108 MMOL/L (ref 94–109)
CO2 SERPL-SCNC: 27 MMOL/L (ref 20–32)
CREAT SERPL-MCNC: 0.79 MG/DL (ref 0.52–1.04)
DIFFERENTIAL METHOD BLD: NORMAL
EOSINOPHIL # BLD AUTO: 0.2 10E9/L (ref 0–0.7)
EOSINOPHIL NFR BLD AUTO: 3 %
ERYTHROCYTE [DISTWIDTH] IN BLOOD BY AUTOMATED COUNT: 13.5 % (ref 10–15)
ETHANOL SERPL-MCNC: <0.01 G/DL
GFR SERPL CREATININE-BSD FRML MDRD: 75 ML/MIN/{1.73_M2}
GLUCOSE SERPL-MCNC: 95 MG/DL (ref 70–99)
HCT VFR BLD AUTO: 40.7 % (ref 35–47)
HGB BLD-MCNC: 13.1 G/DL (ref 11.7–15.7)
IMM GRANULOCYTES # BLD: 0 10E9/L (ref 0–0.4)
IMM GRANULOCYTES NFR BLD: 0.5 %
LYMPHOCYTES # BLD AUTO: 1.5 10E9/L (ref 0.8–5.3)
LYMPHOCYTES NFR BLD AUTO: 23.9 %
MCH RBC QN AUTO: 32 PG (ref 26.5–33)
MCHC RBC AUTO-ENTMCNC: 32.2 G/DL (ref 31.5–36.5)
MCV RBC AUTO: 99 FL (ref 78–100)
MONOCYTES # BLD AUTO: 0.4 10E9/L (ref 0–1.3)
MONOCYTES NFR BLD AUTO: 6.6 %
NEUTROPHILS # BLD AUTO: 4.2 10E9/L (ref 1.6–8.3)
NEUTROPHILS NFR BLD AUTO: 65.4 %
NRBC # BLD AUTO: 0 10*3/UL
NRBC BLD AUTO-RTO: 0 /100
PLATELET # BLD AUTO: 251 10E9/L (ref 150–450)
POTASSIUM SERPL-SCNC: 3.9 MMOL/L (ref 3.4–5.3)
PROT SERPL-MCNC: 7.1 G/DL (ref 6.8–8.8)
RBC # BLD AUTO: 4.1 10E12/L (ref 3.8–5.2)
SODIUM SERPL-SCNC: 142 MMOL/L (ref 133–144)
TROPONIN I SERPL-MCNC: <0.015 UG/L (ref 0–0.04)
WBC # BLD AUTO: 6.4 10E9/L (ref 4–11)

## 2019-12-04 PROCEDURE — 40000965 ZZH STATISTIC END TITIAL CO2 MONITORING

## 2019-12-04 PROCEDURE — 25000128 H RX IP 250 OP 636: Performed by: EMERGENCY MEDICINE

## 2019-12-04 PROCEDURE — 40000986 XR PELVIS PORT 1/2 VW

## 2019-12-04 PROCEDURE — 96372 THER/PROPH/DIAG INJ SC/IM: CPT

## 2019-12-04 PROCEDURE — 25000128 H RX IP 250 OP 636

## 2019-12-04 PROCEDURE — 99285 EMERGENCY DEPT VISIT HI MDM: CPT | Mod: 25

## 2019-12-04 PROCEDURE — 80053 COMPREHEN METABOLIC PANEL: CPT | Performed by: EMERGENCY MEDICINE

## 2019-12-04 PROCEDURE — 85025 COMPLETE CBC W/AUTO DIFF WBC: CPT | Performed by: EMERGENCY MEDICINE

## 2019-12-04 PROCEDURE — 96374 THER/PROPH/DIAG INJ IV PUSH: CPT

## 2019-12-04 PROCEDURE — 99152 MOD SED SAME PHYS/QHP 5/>YRS: CPT

## 2019-12-04 PROCEDURE — 27265 TREAT HIP DISLOCATION: CPT | Mod: LT

## 2019-12-04 PROCEDURE — 84484 ASSAY OF TROPONIN QUANT: CPT | Performed by: EMERGENCY MEDICINE

## 2019-12-04 PROCEDURE — 80320 DRUG SCREEN QUANTALCOHOLS: CPT | Performed by: EMERGENCY MEDICINE

## 2019-12-04 PROCEDURE — 73502 X-RAY EXAM HIP UNI 2-3 VIEWS: CPT

## 2019-12-04 PROCEDURE — 40000275 ZZH STATISTIC RCP TIME EA 10 MIN

## 2019-12-04 RX ORDER — PROPOFOL 10 MG/ML
INJECTION, EMULSION INTRAVENOUS
Status: COMPLETED
Start: 2019-12-04 | End: 2019-12-04

## 2019-12-04 RX ORDER — FENTANYL CITRATE 50 UG/ML
100 INJECTION, SOLUTION INTRAMUSCULAR; INTRAVENOUS ONCE
Status: COMPLETED | OUTPATIENT
Start: 2019-12-04 | End: 2019-12-04

## 2019-12-04 RX ORDER — NALOXONE HYDROCHLORIDE 0.4 MG/ML
INJECTION, SOLUTION INTRAMUSCULAR; INTRAVENOUS; SUBCUTANEOUS
Status: DISCONTINUED
Start: 2019-12-04 | End: 2019-12-05 | Stop reason: HOSPADM

## 2019-12-04 RX ORDER — HYDROMORPHONE HYDROCHLORIDE 1 MG/ML
0.2 INJECTION, SOLUTION INTRAMUSCULAR; INTRAVENOUS; SUBCUTANEOUS
Status: COMPLETED | OUTPATIENT
Start: 2019-12-04 | End: 2019-12-04

## 2019-12-04 RX ORDER — OLANZAPINE 10 MG/2ML
10 INJECTION, POWDER, FOR SOLUTION INTRAMUSCULAR DAILY PRN
Status: DISCONTINUED | OUTPATIENT
Start: 2019-12-04 | End: 2019-12-05 | Stop reason: HOSPADM

## 2019-12-04 RX ADMIN — PROPOFOL 30 MG: 10 INJECTION, EMULSION INTRAVENOUS at 18:40

## 2019-12-04 RX ADMIN — PROPOFOL 80 MG: 10 INJECTION, EMULSION INTRAVENOUS at 19:49

## 2019-12-04 RX ADMIN — OLANZAPINE 10 MG: 10 INJECTION, POWDER, FOR SOLUTION INTRAMUSCULAR at 20:39

## 2019-12-04 RX ADMIN — HYDROMORPHONE HYDROCHLORIDE 0.2 MG: 1 INJECTION, SOLUTION INTRAMUSCULAR; INTRAVENOUS; SUBCUTANEOUS at 18:03

## 2019-12-04 RX ADMIN — PROPOFOL 30 MG: 10 INJECTION, EMULSION INTRAVENOUS at 18:46

## 2019-12-04 RX ADMIN — FENTANYL CITRATE 100 MCG: 50 INJECTION, SOLUTION INTRAMUSCULAR; INTRAVENOUS at 18:23

## 2019-12-04 RX ADMIN — FENTANYL CITRATE 50 MCG: 50 INJECTION, SOLUTION INTRAMUSCULAR; INTRAVENOUS at 18:32

## 2019-12-04 RX ADMIN — FENTANYL CITRATE 50 MCG: 50 INJECTION, SOLUTION INTRAMUSCULAR; INTRAVENOUS at 18:35

## 2019-12-04 RX ADMIN — PROPOFOL 30 MG: 10 INJECTION, EMULSION INTRAVENOUS at 18:43

## 2019-12-04 ASSESSMENT — ENCOUNTER SYMPTOMS: ARTHRALGIAS: 1

## 2019-12-04 NOTE — ED TRIAGE NOTES
Pt arrives via EMS from home d/t L hip dislocation while bending wrong on the couch. L hip shortened and internally rotated. CMS intact. Recently replaced 10/21/49. 75 mcg fentanyl given IV. . VSS. ABC intact. A&O x4.

## 2019-12-04 NOTE — ED NOTES
Bed: [facility]  Expected date:   Expected time:   Means of arrival:   Comments:  BV3- hip dislocation to go to room 2 (room 2 to room 1)

## 2019-12-04 NOTE — ED AVS SNAPSHOT
[facility] Emergency Department  [address]  [facility] [address]  Phone:  [phone]  Fax:  [phone]                                    Malou Mccollum   MRN: [id number]    Department:  [facility] Emergency Department   Date of Visit:  12/4/2019           After Visit Summary Signature Page    I have received my discharge instructions, and my questions have been answered. I have discussed any challenges I see with this plan with the nurse or doctor.    ..........................................................................................................................................  Patient/Patient Representative Signature      ..........................................................................................................................................  Patient Representative Print Name and Relationship to Patient    ..................................................               ................................................  Date                                   Time    ..........................................................................................................................................  Reviewed by Signature/Title    ...................................................              ..............................................  Date                                               Time          22EPIC Rev 08/18

## 2019-12-04 NOTE — ED PROVIDER NOTES
"  History     Chief Complaint:  Hip Pain    HPI   Malou Mccollum is a 74 year old female with a history of recurrent hip dislocation, osteoarthritis, diabetes, tobacco abuse, hypertension, hyperlipidemia, among others who presents via EMS for evaluation of left hip pain. The patient states she has had recurrent hip dislocations after a fall coming out of a shower 2 1/2 years ago. She notes she had her initial hip replacement 17 years ago, and a total replacement on 2019 due to 6 hip locations over 2 years. The patient states she was last seen by her surgeon on  and she dislocated her hip the next day. Today while bending over to  her calendar, she bent over with her left food on the edge of a kennel and her hip \"popped out\", causing her to fall backwards on the couch. She denies hitting her head. She called EMS for evaluation.    En route, EMS administered 75 mcg fentanyl in an IV. Her blood glucose was 146. They noted the left hip was shortened and internally rotated.    Here, the patient does not state any other symptoms.     Allergies:  Aleve  Dextrans  Penicillin G  Adhesive tape  Varenicline    Naproxen    Medications:    Lipitor  Fergon  Allegra  Neurontin  Atarax  Prilosec  Trileptal  Roxicodone  Miralax  Senokot  Zoloft  Zanaflex     Past Medical History:    Iron deficiency anemia  Bipolar 1 disorder  Osteoarthritis  Type 2 diabetes  GERD  Hyperlipidemia  Nephrolithiasis  Obesity  Osteoporosis  LBP  Neuropathy  Allergic rhinitis  Tobacco abuse  Bradycardia  Adenomatous polyp of colon  Cataract  Hypertension    Past Surgical History:    Gastric bypass  Tonsillectomy   section  Ovarian cyst surgery  Tubal ligation  Appendectomy  Blepharoplasty  Nasal septum surgery  Dental surgery, implants  Mandible surgery  [place] teeth extraction  Lithotripsy  SI joint block  Knee surgery  Total hip arthroplasty     Family History:    Father - Stroke  Mother - Cataract, Glaucoma, " Macular Degeneration    Social History:  The patient was accompanied to the ED by EMS.  Smoking Status: Current, 1.5 packs/day  Smokeless Tobacco: Never  Alcohol Use: No  Drug Use: No   Marital Status:   [4]     Review of Systems   Musculoskeletal: Positive for arthralgias.   All other systems reviewed and are negative.      Physical Exam     Patient Vitals for the past 24 hrs:   BP Temp Temp src Pulse Heart Rate Resp SpO2 Weight   12/04/19 2300 -- -- -- -- 65 15 92 % --   12/04/19 2245 (!) 153/66 -- -- 67 69 20 94 % --   12/04/19 2230 (!) 162/76 -- -- 70 73 18 93 % --   12/04/19 2215 (!) 141/64 -- -- 67 69 21 93 % --   12/04/19 2200 (!) 149/71 -- -- 71 72 19 92 % --   12/04/19 2145 (!) 157/72 -- -- 73 72 17 93 % --   12/04/19 2140 (!) 162/69 -- -- 71 69 19 92 % --   12/04/19 2135 (!) 171/73 -- -- 70 72 20 93 % --   12/04/19 2130 (!) 165/71 -- -- 72 75 20 92 % --   12/04/19 2115 (!) 175/77 -- -- 71 70 16 93 % --   12/04/19 2100 (!) 187/87 -- -- 75 74 14 97 % --   12/04/19 2045 (!) 173/123 -- -- 82 -- 16 95 % --   12/04/19 2015 (!) 155/65 -- -- -- 68 (!) 43 91 % --   12/04/19 2000 (!) 150/68 -- -- 69 68 19 94 % --   12/04/19 1950 (!) 157/75 -- -- 66 66 22 96 % --   12/04/19 1945 136/71 -- -- 65 66 16 96 % --   12/04/19 1940 (!) 162/73 -- -- 66 66 15 97 % --   12/04/19 1935 (!) 168/85 -- -- 65 69 13 -- --   12/04/19 1930 (!) 148/129 -- -- 73 74 14 95 % --   12/04/19 1925 (!) 148/66 -- -- 68 63 16 92 % --   12/04/19 1920 (!) 151/47 -- -- 64 68 12 95 % --   12/04/19 1915 (!) 163/58 -- -- 68 70 13 98 % --   12/04/19 1900 (!) 148/80 -- -- 68 67 14 98 % --   12/04/19 1855 (!) 126/97 -- -- 65 66 11 99 % --   12/04/19 1850 (!) 171/80 -- -- 65 75 13 96 % --   12/04/19 1845 (!) 177/93 -- -- 69 62 24 99 % --   12/04/19 1840 (!) 162/117 -- -- 76 66 24 97 % --   12/04/19 1835 (!) 169/121 -- -- 73 70 17 99 % --   12/04/19 1830 (!) 166/86 -- -- 64 67 11 100 % --   12/04/19 1800 -- -- -- -- 68 25 94 % --   12/04/19 1745 (!)  "151/108 -- -- 64 66 18 100 % --   12/04/19 1700 -- -- -- -- -- -- 92 % --   12/04/19 1645 (!) 144/70 -- -- 65 -- -- 94 % --   12/04/19 1630 137/81 -- -- 70 -- -- 95 % --   12/04/19 1623 (!) 146/78 98.3  F (36.8  C) Oral -- 66 16 95 % 87.5 kg (193 lb)      Physical Exam   General: Patient is alert, awake and interactive when I enter the room  Head: The scalp, face, and head appear normal. Atraumatic.   Eyes: The pupils are equal, round, and reactive to light. Conjunctivae and sclerae are normal  Neck: Normal range of motion. No cervical midline tenderness.   CV: Regular rate. S1/S2. No murmurs.   Resp: Lungs are clear without wheezes or rales. No distress. No crepitance.   GI: Abdomen is soft, no rigidity, guarding, or rebound. No contusion. No distension. No tenderness to palpation in any quadrant.     MS: Normal tone. Obvious dislocated of left lower extremity at the hip joint with significant decreased range of motion secondary to deformity of pain. Distally neurovascularly intact. No C/T/L tenderness in midline  Skin: No rash or lesions noted. Normal capillary refill noted  Neuro: GCS 15.  CN\"s II-XII intact. Speech is normal and fluent. Face is symmetric.  Psych: Normal affect.  Appropriate interactions.     Emergency Department Course   ECG:  ECG taken at 2118, ECG read at 2149 by Dr. Antony Em MD  Normal sinus rhythm  Normal ECG  Rate 72 bpm. PA interval 160. QRS duration 82. QT/QTc 416/455. P-R-T axes 25 37 64.     Imaging:  Radiology findings were communicated with the patient who voiced understanding of the findings.    XR Pelvis and Hip Left 1 View Pre-Procedure  IMPRESSION: Superolateral dislocation of the left femoral component of the patient's left hip total arthroplasty. No fracture.  Reading per radiology.     Pelvis XR Port 1/2 view Post-Procedure  IMPRESSION: Patient is status post bilateral hip arthroplasty. The left femoral component of the left hip arthroplasty is superolaterally " dislocated.  Reading per radiology.     Pelvis XR Port 1/2 view Post-Procedure  IMPRESSION: The femoral component of the left hip arthroplasty has been relocated. No evidence for fracture.  Reading per radiology.     Procedures:  [facility]    Procedure: Mild Sedation  Date/Time: 12/4/2019 6:10 PM  Performed by: Antony Em MD  Authorized by: Antony Em MD     UNIVERSAL PROTOCOL   Site Marked: Yes  Prior Images Obtained and Reviewed:  Yes  Required items: Required blood products, implants, devices and special equipment available    Patient identity confirmed:  Verbally with patient, arm band and hospital-assigned identification number  Patient was reevaluated immediately before administering moderate or deep sedation or anesthesia  Confirmation Checklist:  Patient's identity using two indicators, relevant allergies, procedure was appropriate and matched the consent or emergent situation and correct equipment/implants were available  Time out: Immediately prior to the procedure a time out was called    Preparation: Patient was prepped and draped in usual sterile fashion      SEDATION    Patient Sedated: Yes    Sedation Type:  Moderate (conscious) sedation  Sedation:  See MAR for details, propofol and fentanyl  Vital signs: Vital signs monitored during sedation    PROCEDURE   Patient Tolerance:  Patient tolerated the procedure well with no immediate complications  Describe Procedure: Sedation was maintained until the procedure was complete. The patient was monitored by staff until recovered.  Length of time physician/provider present for 1:1 monitoring during sedation: 20    Procedure:  Left Hip Reduction    Indication: Left hip dislocation as seen on x-ray.    Consent: Risks, benefits, and alternatives were discussed with the patient and consent for procedure was obtained.      Timeout: Universal protocol was followed. TIME OUT conducted just prior to starting  procedure confirmed patient identity, site/side, procedure, patient position, and availability of correct equipment and implants.      Medication: The patient was sedated using IV Propofol in moderate sedation as noted in above procedure note.    Technique: The affected hip was placed into 90 degrees flexion, inline traction was placed gently until a palpable pop was noted and clinical reduction apparent.  Leg was demonstrated to have equal length and lie to the contralateral side.  Sensation, motor function, and circulation are intact after reduction, and a post-procedural X-ray shows unsuccessful reduction of the left hip. There were no complications. The patient tolerated the procedure well.    [facility]    Procedure: Mild Sedation  Date/Time: 12/4/2019 6:10 PM  Performed by: Antony Em MD  Authorized by: Antony Em MD     UNIVERSAL PROTOCOL   Site Marked: Yes  Prior Images Obtained and Reviewed:  Yes  Required items: Required blood products, implants, devices and special equipment available    Patient identity confirmed:  Verbally with patient, arm band and hospital-assigned identification number  Patient was reevaluated immediately before administering moderate or deep sedation or anesthesia  Confirmation Checklist:  Patient's identity using two indicators, relevant allergies, procedure was appropriate and matched the consent or emergent situation and correct equipment/implants were available  Time out: Immediately prior to the procedure a time out was called    Preparation: Patient was prepped and draped in usual sterile fashion      SEDATION    Patient Sedated: Yes    Sedation Type:  Moderate (conscious) sedation  Sedation:  See MAR for details, propofol and fentanyl  Vital signs: Vital signs monitored during sedation    PROCEDURE   Patient Tolerance:  Patient tolerated the procedure well with no immediate complications  Describe Procedure: Sedation was  maintained until the procedure was complete. The patient was monitored by staff until recovered.  Length of time physician/provider present for 1:1 monitoring during sedation: 20    Procedure:  Left Hip Reduction    Indication: Left hip dislocation as seen on x-ray.    Consent: Risks, benefits, and alternatives were discussed with the patient and consent for procedure was obtained.      Timeout: Universal protocol was followed. TIME OUT conducted just prior to starting procedure confirmed patient identity, site/side, procedure, patient position, and availability of correct equipment and implants.      Medication: The patient was sedated using IV Propofol in moderate sedation as noted in above procedure note.    Technique: The affected hip was placed into 90 degrees flexion inline traction was placed gently until a palpable pop was noted and clinical reduction apparent.  Leg was demonstrated to have equal length and lie to the contralateral side.  Sensation, motor function, and circulation are intact after reduction, and a post-procedural X-ray shows successful reduction of the left hip. There were no complications. The patient tolerated the procedure well.    Interventions:  1803 Dilaudid 0.2 mg IV   1823 Sublimaze 100 mcg IV  1832 Sublimaze 50 mcg IV  1835 Sublimaze 50 mcg IV  1840 Diprivan 30 mg IV  1843 Diprivan 30 mg IV  1846 Diprivan 30 mg IV  1949 Diprivan 80 mg IV  2039 Zyprexa 10 mg IM    Emergency Department Course:  Nursing notes and vitals reviewed.  EKG obtained in the ED, see results above.    The patient was sent for a XR Pelvis and Hip Left 1 View while in the emergency department, results above.      (1628)   I performed an exam of the patient as documented above. History obtained from patient.    (1810)   I rechecked the patient.     (1821)   I performed the left hip reduction, as noted above.     (1850)   Portable xray called.    (1855)   Portable xray arrives in room.     (1900)   I read the xray  results.    (1900)   I rechecked the patient.     (1925)   I performed the left hip reduction, as noted above.     (1959)   I rechecked the patient.    (2019)   Ortho paged.    (2023)   I spoke with a nurse for Dr. José Miguel Harris of the Orthopedic service from [facility] regarding patient's presentation, findings, and plan of care.     (2028)   I rechecked the patient. Patient was trying to get out of bed as I was explaining to her she was unsafe to leave and probably needs her hip revised and she was not safe to go home. She appeared confused and disoriented. Family in the room agrees the patient is disoriented. Family escorted out of room. Continued to try to discuss plan with patient, but was unwilling to discuss risks of rehip location. Administered 10 mg IM Zyprexa.     (2034)   Code 21 called.     (2044)   Patient placed in 5-point restraints.     (2139)   I spoke with Dr. José Miguel Harris of the Orthopedic service from [facility] regarding patient's presentation, findings, and plan of care.     (2300)   Patient signed out to my [facility] partner, Dr. Shore. Disposition pending reevaluation.     Impression & Plan      Medical Decision Making:  Today with left hip pain.  Patient has history of recurrent dislocation of her left hip which is been an ongoing problem for some time.  Unfortunately even after undergoing a total hip replacement she is still had 3 such episodes in the past few months.  X-rays revealed a superiorly lateral hip dislocation without any acute signs of fracture.  After discussing the risks and benefits patient underwent procedural sedation for reduction of her dislocation this was done successfully with fentanyl and propofol.  Propofol was done in 30 mg boluses without significant episodes of hypoxia.  We were able to reduce the hip but unfortunately before the x-ray could be obtained the hip again dislocated.  Patient underwent a second sedation with propofol again in 30 mg  boluses.  This reduction was significantly more difficult than the first and required multiple physicians and additional help to reduce the hip back into place.  Fortunately we are able to successfully put her prosthetic hip back into place.  Following the sedation the patient was quite agitated and ripped out her IV and attempted to leave the emergency department.  At this point I tried to explain to the patient that I was quite concerned that her hip was unstable and that we would need further orthopedic evaluation.  Patient did not appear to be able to adequately understand the risks of her leaving the hospital therefore I placed the patient on a transport hold and she received 10 mg of IM Zyprexa for her agitation.  She was then placed in five-point restraints.  I spoke with her family who agreed that she had been acting mildly confused after the sedation and agreed that she was not able to adequately understand her decisions.  I did discuss the case with Dr. JONATHAN Harris of [facility] orthopedic who believes that the patient should likely undergo a revision of her total hip with her primary orthopedist.  Plan for patient is following conclusion of her Zyprexa sedation is to have a conversation regarding recommendation for transfer to [facility] for further orthopedic evaluation and consultation by her primary orthopedics Dr. Neo Harris of [facility] orth[facility]ics.  If the patient is unwilling to be transferred she will need to demonstrate her ability to ambulate self sufficiently without any significant difficulty if she is to be discharged home.  She will also need to adequately demonstrate her ability to understand the risks of being discharged with a very tenuous left hip joint.  Patient will be signed out to my colleague pending final disposition.    Diagnosis:    ICD-10-CM    1. Closed dislocation of left hip, initial encounter (H) S73.005A         Disposition:   Signed out to Dr. Shore.     Radha  Disclosure:  I, Dm Power, am serving as a scribe at 4:21 PM on 12/4/2019 to document services personally performed by Antony Em mD based on my observations and the provider's statements to me.  12/4/2019   [facility] EMERGENCY DEPARTMENT       Antony Em MD  12/04/19 1561

## 2019-12-05 VITALS
BODY MASS INDEX: 36.47 KG/M2 | WEIGHT: 193 LBS | SYSTOLIC BLOOD PRESSURE: 170 MMHG | HEART RATE: 57 BPM | TEMPERATURE: 98.3 F | DIASTOLIC BLOOD PRESSURE: 72 MMHG | OXYGEN SATURATION: 90 % | RESPIRATION RATE: 27 BRPM

## 2019-12-05 LAB — INTERPRETATION ECG - MUSE: NORMAL

## 2019-12-05 NOTE — ED PROVIDER NOTES
11:27 PM:   74 yoF with recurrent L hip dislocations, s/p L total hip by Dr Neo Harris ([facility]) at [facility]). Today slipped and sustained L hip dislocation. Superior/lateral dislocation, no fx. Was reduced in ER but then dislocated again. Had 2nd sedation with propofol with intermittent successful reductions. Currently reduced. Now confused after sedation and agitated. Received zyprexa. Dr Em spoke with ortho here recommending transfer to [facility] for revision of hip. Offer transfer to [facility] once clear. No known h/o dementia. Pt own decision maker.     12:19 AM  Still not wanting to engage in conversation, wants to be left alone, will continue restraints for now but deescalate as able.  In person face to face assessment completed, including an evaluation of the patient's immediate reaction to the intervention, complete review of systems assessment, behavioral assessment and review/assessment of history, drugs and medications, recent labs, etc., and behavioral condition.  The patient experienced: No adverse physical outcome from seclusion/restraint initiation.  The intervention of restraint or seclusion needs to continue.    4:34 AM  All restraints removed. Pt sleeping. Will continue to monitor.     5:46 AM  Patient awake and alert. She still expresses wishes to go home.  She understands that if she were to go home she could suffer serious morbidity from recurrent hip dislocation.  She tolerated p.o., she ambulated steadily in the ER with walker without assistance.  I strongly encouraged her to follow-up with her orthopedic physician as soon as possible.  Of note patient did have mild desaturation to the high 80s on room air while sleeping/snoring.  I wonder if she has a component of obstructive sleep apnea.  I also recommended she see her primary care physician.  Close return precautions were discussed prior to discharge.     Shola Shoer MD  12/05/19 0565

## 2019-12-05 NOTE — ED NOTES
Restraints removed by security. Pt remains injury free. Pt still flat affect, but tired, not trying to get out of bed or attempting to leave.  Sitter in room, 1:1.

## 2019-12-05 NOTE — ED NOTES
"RN continues frequent rounding, assessment of pt and restraints. Pt remains injury-free. Peripheral pulses 2+. Continue to discuss parameters for restraint removal. Pt has flat affect, angry, just tells this RN to \"get away from her, I'm fine\". VSS.  "

## 2019-12-05 NOTE — SEDATION DOCUMENTATION
Pt tolerated fentanyl well, but not enough sedation. VORB to given propofol. Pt tolerated well. Additional help by Dr. Barrow with counter traction, L hip back in place. Will follow up with post-sedation xray. VSS. Pt awake. Will continue to monitor.

## 2019-12-05 NOTE — ED NOTES
Pt calm and mildly sedated at this time. Responding to RN but angry. Restraints assessed, no injury, peripheral pulses in-tact, cap refill < 2 sec.

## 2019-12-05 NOTE — ED NOTES
"Pt resting, calm.  Continuing frequent rounds.  This RN discussed conditions requisite for removing restraints with patient. Pt states \"she does not need or want her restraints removed.\" Assessed skin condition at all restraint sites, no injury. All peripheral pulses 2+. VSS.  "

## 2019-12-05 NOTE — PROGRESS NOTES
RT- Attended conscious sedation. Ambu bag and suction set up and ready if needed, used ambu bag briefly for a slight drop in oxygen saturations. Patient was placed on ETCO2 with oxygen. Stayed until patient was awake and alert.     Carmelita Rob, RT  December 4, 2019

## 2019-12-05 NOTE — ED NOTES
Family called this RN to the room.  Pt had ripped out IV and insisted on going home, pt was trying to get out of bed.  This RN explained to pt earlier that her hip was unstable and she could not leave the hospital at this time, that she would need to be transferred and into surgery for her hip.  MD responded to room and explained same.  Pt became increasingly agitated and insisted on leaving, non-cooperative to RN and MD requests. Pt became angry and started to yell and fight to get out of bed. Code 21 called. Pt pharmacologically and physically restrained, MD in room.

## 2019-12-05 NOTE — DISCHARGE INSTRUCTIONS
Return for new concerns or pain in the hip. See your orthopedic doctor ASAP to discuss revision of the hip.

## 2019-12-05 NOTE — ED NOTES
Woke pt up had conversation with . Did well walking got cloths on pt able to walk unaided . Pt insisted on calling cab refused to call family . Was able to use bathroom unaided.